# Patient Record
Sex: MALE | Race: WHITE | NOT HISPANIC OR LATINO
[De-identification: names, ages, dates, MRNs, and addresses within clinical notes are randomized per-mention and may not be internally consistent; named-entity substitution may affect disease eponyms.]

---

## 2017-01-13 ENCOUNTER — APPOINTMENT (OUTPATIENT)
Dept: HEART AND VASCULAR | Facility: CLINIC | Age: 72
End: 2017-01-13

## 2017-01-13 VITALS
HEART RATE: 49 BPM | WEIGHT: 177 LBS | BODY MASS INDEX: 22.72 KG/M2 | DIASTOLIC BLOOD PRESSURE: 56 MMHG | SYSTOLIC BLOOD PRESSURE: 104 MMHG | HEIGHT: 74 IN

## 2017-01-15 LAB
ALBUMIN SERPL ELPH-MCNC: 4.2 G/DL
ALP BLD-CCNC: 73 U/L
ALT SERPL-CCNC: 22 U/L
ANION GAP SERPL CALC-SCNC: 12 MMOL/L
AST SERPL-CCNC: 16 U/L
BASOPHILS # BLD AUTO: 0.05 K/UL
BASOPHILS NFR BLD AUTO: 0.8 %
BILIRUB SERPL-MCNC: 0.6 MG/DL
BUN SERPL-MCNC: 21 MG/DL
CALCIUM SERPL-MCNC: 9.7 MG/DL
CHLORIDE SERPL-SCNC: 104 MMOL/L
CHOLEST SERPL-MCNC: 132 MG/DL
CHOLEST/HDLC SERPL: 3.4 RATIO
CO2 SERPL-SCNC: 25 MMOL/L
CREAT SERPL-MCNC: 1.13 MG/DL
EOSINOPHIL # BLD AUTO: 0.26 K/UL
EOSINOPHIL NFR BLD AUTO: 4.1 %
GLUCOSE SERPL-MCNC: 111 MG/DL
HBA1C MFR BLD HPLC: 6.2 %
HCT VFR BLD CALC: 47.2 %
HDLC SERPL-MCNC: 39 MG/DL
HGB BLD-MCNC: 15.4 G/DL
IMM GRANULOCYTES NFR BLD AUTO: 0.2 %
INR PPP: 2.14 RATIO
LDLC SERPL CALC-MCNC: 67 MG/DL
LYMPHOCYTES # BLD AUTO: 1.52 K/UL
LYMPHOCYTES NFR BLD AUTO: 23.9 %
MAN DIFF?: NORMAL
MCHC RBC-ENTMCNC: 31.1 PG
MCHC RBC-ENTMCNC: 32.6 GM/DL
MCV RBC AUTO: 95.4 FL
MONOCYTES # BLD AUTO: 0.55 K/UL
MONOCYTES NFR BLD AUTO: 8.6 %
NEUTROPHILS # BLD AUTO: 3.98 K/UL
NEUTROPHILS NFR BLD AUTO: 62.4 %
PLATELET # BLD AUTO: 157 K/UL
POTASSIUM SERPL-SCNC: 5.2 MMOL/L
PROT SERPL-MCNC: 6.7 G/DL
PT BLD: 24.4 SEC
RBC # BLD: 4.95 M/UL
RBC # FLD: 13.8 %
SODIUM SERPL-SCNC: 141 MMOL/L
TRIGL SERPL-MCNC: 131 MG/DL
TSH SERPL-ACNC: 1.8 UIU/ML
WBC # FLD AUTO: 6.37 K/UL

## 2017-01-15 RX ORDER — CEFDINIR 300 MG/1
300 CAPSULE ORAL
Qty: 18 | Refills: 0 | Status: DISCONTINUED | COMMUNITY
Start: 2016-10-29

## 2017-01-27 ENCOUNTER — APPOINTMENT (OUTPATIENT)
Dept: HEART AND VASCULAR | Facility: CLINIC | Age: 72
End: 2017-01-27

## 2017-01-31 ENCOUNTER — OTHER (OUTPATIENT)
Age: 72
End: 2017-01-31

## 2017-02-10 ENCOUNTER — APPOINTMENT (OUTPATIENT)
Dept: HEART AND VASCULAR | Facility: CLINIC | Age: 72
End: 2017-02-10

## 2017-02-13 ENCOUNTER — OTHER (OUTPATIENT)
Age: 72
End: 2017-02-13

## 2017-03-03 ENCOUNTER — APPOINTMENT (OUTPATIENT)
Dept: HEART AND VASCULAR | Facility: CLINIC | Age: 72
End: 2017-03-03

## 2017-03-06 RX ORDER — WARFARIN 3 MG/1
3 TABLET ORAL
Qty: 15 | Refills: 0 | Status: DISCONTINUED | COMMUNITY
Start: 2017-03-06 | End: 2017-03-06

## 2017-03-17 ENCOUNTER — APPOINTMENT (OUTPATIENT)
Dept: HEART AND VASCULAR | Facility: CLINIC | Age: 72
End: 2017-03-17

## 2017-03-30 ENCOUNTER — OTHER (OUTPATIENT)
Age: 72
End: 2017-03-30

## 2017-03-31 ENCOUNTER — APPOINTMENT (OUTPATIENT)
Dept: HEART AND VASCULAR | Facility: CLINIC | Age: 72
End: 2017-03-31

## 2017-04-03 ENCOUNTER — OTHER (OUTPATIENT)
Age: 72
End: 2017-04-03

## 2017-04-19 ENCOUNTER — APPOINTMENT (OUTPATIENT)
Dept: HEART AND VASCULAR | Facility: CLINIC | Age: 72
End: 2017-04-19

## 2017-04-19 VITALS
SYSTOLIC BLOOD PRESSURE: 102 MMHG | DIASTOLIC BLOOD PRESSURE: 60 MMHG | WEIGHT: 178 LBS | HEIGHT: 74 IN | BODY MASS INDEX: 22.84 KG/M2 | HEART RATE: 47 BPM

## 2017-04-19 DIAGNOSIS — R06.09 OTHER FORMS OF DYSPNEA: ICD-10-CM

## 2017-04-19 RX ORDER — WARFARIN SODIUM 3 MG/1
3 TABLET ORAL
Qty: 15 | Refills: 0 | Status: DISCONTINUED | COMMUNITY
Start: 2017-03-06 | End: 2017-04-19

## 2017-04-20 ENCOUNTER — OTHER (OUTPATIENT)
Age: 72
End: 2017-04-20

## 2017-05-10 ENCOUNTER — APPOINTMENT (OUTPATIENT)
Dept: HEART AND VASCULAR | Facility: CLINIC | Age: 72
End: 2017-05-10

## 2017-05-11 ENCOUNTER — OTHER (OUTPATIENT)
Age: 72
End: 2017-05-11

## 2017-06-02 ENCOUNTER — APPOINTMENT (OUTPATIENT)
Dept: HEART AND VASCULAR | Facility: CLINIC | Age: 72
End: 2017-06-02

## 2017-06-05 ENCOUNTER — OTHER (OUTPATIENT)
Age: 72
End: 2017-06-05

## 2017-06-16 ENCOUNTER — APPOINTMENT (OUTPATIENT)
Dept: HEART AND VASCULAR | Facility: CLINIC | Age: 72
End: 2017-06-16

## 2017-06-19 ENCOUNTER — OTHER (OUTPATIENT)
Age: 72
End: 2017-06-19

## 2017-06-30 ENCOUNTER — APPOINTMENT (OUTPATIENT)
Dept: HEART AND VASCULAR | Facility: CLINIC | Age: 72
End: 2017-06-30

## 2017-07-13 ENCOUNTER — APPOINTMENT (OUTPATIENT)
Dept: HEART AND VASCULAR | Facility: CLINIC | Age: 72
End: 2017-07-13

## 2017-07-14 ENCOUNTER — OTHER (OUTPATIENT)
Age: 72
End: 2017-07-14

## 2017-07-24 ENCOUNTER — APPOINTMENT (OUTPATIENT)
Dept: HEART AND VASCULAR | Facility: CLINIC | Age: 72
End: 2017-07-24

## 2017-07-24 VITALS
WEIGHT: 178 LBS | SYSTOLIC BLOOD PRESSURE: 114 MMHG | HEIGHT: 74 IN | BODY MASS INDEX: 22.84 KG/M2 | HEART RATE: 47 BPM | DIASTOLIC BLOOD PRESSURE: 62 MMHG

## 2017-07-24 DIAGNOSIS — Z00.00 ENCOUNTER FOR GENERAL ADULT MEDICAL EXAMINATION W/OUT ABNORMAL FINDINGS: ICD-10-CM

## 2017-07-24 RX ORDER — TRAVOPROST 0.04 MG/ML
0 SOLUTION/ DROPS OPHTHALMIC
Qty: 2 | Refills: 0 | Status: DISCONTINUED | COMMUNITY
Start: 2017-03-06

## 2017-07-24 RX ORDER — TRAVOPROST (BENZALKONIUM) 0.004 %
0 DROPS OPHTHALMIC (EYE)
Refills: 0 | Status: DISCONTINUED | COMMUNITY
End: 2017-07-24

## 2017-07-24 RX ORDER — LATANOPROST/PF 0.005 %
0.01 DROPS OPHTHALMIC (EYE)
Qty: 2 | Refills: 0 | Status: ACTIVE | COMMUNITY
Start: 2017-06-05

## 2017-07-25 ENCOUNTER — OTHER (OUTPATIENT)
Age: 72
End: 2017-07-25

## 2017-07-25 LAB
ANION GAP SERPL CALC-SCNC: 15 MMOL/L
BASOPHILS # BLD AUTO: 0.11 K/UL
BASOPHILS NFR BLD AUTO: 1.4 %
BUN SERPL-MCNC: 20 MG/DL
CALCIUM SERPL-MCNC: 10.2 MG/DL
CHLORIDE SERPL-SCNC: 101 MMOL/L
CO2 SERPL-SCNC: 26 MMOL/L
CREAT SERPL-MCNC: 1.18 MG/DL
EOSINOPHIL # BLD AUTO: 0.26 K/UL
EOSINOPHIL NFR BLD AUTO: 3.3 %
GLUCOSE SERPL-MCNC: 84 MG/DL
HBA1C MFR BLD HPLC: 6 %
HCT VFR BLD CALC: 47 %
HGB BLD-MCNC: 15.1 G/DL
IMM GRANULOCYTES NFR BLD AUTO: 0.3 %
INR PPP: 3.49 RATIO
LYMPHOCYTES # BLD AUTO: 1.84 K/UL
LYMPHOCYTES NFR BLD AUTO: 23.6 %
MAN DIFF?: NORMAL
MCHC RBC-ENTMCNC: 31.5 PG
MCHC RBC-ENTMCNC: 32.1 GM/DL
MCV RBC AUTO: 97.9 FL
MONOCYTES # BLD AUTO: 0.7 K/UL
MONOCYTES NFR BLD AUTO: 9 %
NEUTROPHILS # BLD AUTO: 4.87 K/UL
NEUTROPHILS NFR BLD AUTO: 62.4 %
PLATELET # BLD AUTO: 171 K/UL
POTASSIUM SERPL-SCNC: 5.1 MMOL/L
PT BLD: 40.5 SEC
RBC # BLD: 4.8 M/UL
RBC # FLD: 14.1 %
SODIUM SERPL-SCNC: 142 MMOL/L
WBC # FLD AUTO: 7.8 K/UL

## 2017-08-14 ENCOUNTER — APPOINTMENT (OUTPATIENT)
Dept: HEART AND VASCULAR | Facility: CLINIC | Age: 72
End: 2017-08-14
Payer: MEDICARE

## 2017-08-14 PROCEDURE — 36415 COLL VENOUS BLD VENIPUNCTURE: CPT

## 2017-08-15 ENCOUNTER — OTHER (OUTPATIENT)
Age: 72
End: 2017-08-15

## 2017-08-24 ENCOUNTER — APPOINTMENT (OUTPATIENT)
Dept: HEART AND VASCULAR | Facility: CLINIC | Age: 72
End: 2017-08-24
Payer: MEDICARE

## 2017-08-24 LAB
INR PPP: 2.57 RATIO
PT BLD: 29.6 SEC

## 2017-08-24 PROCEDURE — 36415 COLL VENOUS BLD VENIPUNCTURE: CPT

## 2017-08-29 ENCOUNTER — RX RENEWAL (OUTPATIENT)
Age: 72
End: 2017-08-29

## 2017-09-08 ENCOUNTER — APPOINTMENT (OUTPATIENT)
Dept: HEART AND VASCULAR | Facility: CLINIC | Age: 72
End: 2017-09-08
Payer: MEDICARE

## 2017-09-08 PROCEDURE — 36415 COLL VENOUS BLD VENIPUNCTURE: CPT

## 2017-09-11 ENCOUNTER — OTHER (OUTPATIENT)
Age: 72
End: 2017-09-11

## 2017-09-28 ENCOUNTER — APPOINTMENT (OUTPATIENT)
Dept: HEART AND VASCULAR | Facility: CLINIC | Age: 72
End: 2017-09-28
Payer: MEDICARE

## 2017-09-28 PROCEDURE — 36415 COLL VENOUS BLD VENIPUNCTURE: CPT

## 2017-09-29 ENCOUNTER — OTHER (OUTPATIENT)
Age: 72
End: 2017-09-29

## 2017-09-29 LAB
INR PPP: 3.14 RATIO
PT BLD: 36.3 SEC

## 2017-10-20 ENCOUNTER — APPOINTMENT (OUTPATIENT)
Dept: HEART AND VASCULAR | Facility: CLINIC | Age: 72
End: 2017-10-20
Payer: MEDICARE

## 2017-10-20 PROCEDURE — 36415 COLL VENOUS BLD VENIPUNCTURE: CPT

## 2017-10-23 ENCOUNTER — OTHER (OUTPATIENT)
Age: 72
End: 2017-10-23

## 2017-10-23 LAB
INR PPP: 3.42 RATIO
PT BLD: 39.6 SEC

## 2017-10-27 ENCOUNTER — APPOINTMENT (OUTPATIENT)
Dept: HEART AND VASCULAR | Facility: CLINIC | Age: 72
End: 2017-10-27
Payer: MEDICARE

## 2017-10-27 VITALS
BODY MASS INDEX: 23.36 KG/M2 | DIASTOLIC BLOOD PRESSURE: 60 MMHG | HEART RATE: 50 BPM | HEIGHT: 74 IN | SYSTOLIC BLOOD PRESSURE: 100 MMHG | WEIGHT: 182 LBS

## 2017-10-27 DIAGNOSIS — Z87.01 PERSONAL HISTORY OF PNEUMONIA (RECURRENT): ICD-10-CM

## 2017-10-27 DIAGNOSIS — Z97.4 PRESENCE OF EXTERNAL HEARING-AID: ICD-10-CM

## 2017-10-27 DIAGNOSIS — R73.9 HYPERGLYCEMIA, UNSPECIFIED: ICD-10-CM

## 2017-10-27 DIAGNOSIS — R00.1 BRADYCARDIA, UNSPECIFIED: ICD-10-CM

## 2017-10-27 PROCEDURE — 99214 OFFICE O/P EST MOD 30 MIN: CPT | Mod: 25

## 2017-10-27 PROCEDURE — 93000 ELECTROCARDIOGRAM COMPLETE: CPT

## 2017-10-27 PROCEDURE — 36415 COLL VENOUS BLD VENIPUNCTURE: CPT

## 2017-10-29 PROBLEM — Z97.4 HEARING AID WORN: Status: ACTIVE | Noted: 2017-10-29

## 2017-10-29 PROBLEM — Z87.01 HISTORY OF PNEUMONIA: Status: RESOLVED | Noted: 2017-01-16 | Resolved: 2017-10-29

## 2017-10-30 ENCOUNTER — TRANSCRIPTION ENCOUNTER (OUTPATIENT)
Age: 72
End: 2017-10-30

## 2017-10-30 ENCOUNTER — OTHER (OUTPATIENT)
Age: 72
End: 2017-10-30

## 2017-10-30 LAB
ALBUMIN SERPL ELPH-MCNC: 4.3 G/DL
ALP BLD-CCNC: 66 U/L
ALT SERPL-CCNC: 22 U/L
ANION GAP SERPL CALC-SCNC: 14 MMOL/L
AST SERPL-CCNC: 20 U/L
BASOPHILS # BLD AUTO: 0.08 K/UL
BASOPHILS NFR BLD AUTO: 0.8 %
BILIRUB SERPL-MCNC: 0.5 MG/DL
BUN SERPL-MCNC: 22 MG/DL
CALCIUM SERPL-MCNC: 9.8 MG/DL
CHLORIDE SERPL-SCNC: 101 MMOL/L
CHOLEST SERPL-MCNC: 131 MG/DL
CHOLEST/HDLC SERPL: 3.2 RATIO
CO2 SERPL-SCNC: 25 MMOL/L
CREAT SERPL-MCNC: 1.19 MG/DL
EOSINOPHIL # BLD AUTO: 0.3 K/UL
EOSINOPHIL NFR BLD AUTO: 3.1 %
GLUCOSE SERPL-MCNC: 79 MG/DL
HBA1C MFR BLD HPLC: 6 %
HCT VFR BLD CALC: 46 %
HDLC SERPL-MCNC: 41 MG/DL
HGB BLD-MCNC: 14.7 G/DL
IMM GRANULOCYTES NFR BLD AUTO: 0.2 %
INR PPP: 3.25 RATIO
LDLC SERPL CALC-MCNC: 48 MG/DL
LYMPHOCYTES # BLD AUTO: 2.02 K/UL
LYMPHOCYTES NFR BLD AUTO: 20.6 %
MAN DIFF?: NORMAL
MCHC RBC-ENTMCNC: 31.4 PG
MCHC RBC-ENTMCNC: 32 GM/DL
MCV RBC AUTO: 98.3 FL
MONOCYTES # BLD AUTO: 0.96 K/UL
MONOCYTES NFR BLD AUTO: 9.8 %
NEUTROPHILS # BLD AUTO: 6.44 K/UL
NEUTROPHILS NFR BLD AUTO: 65.5 %
PLATELET # BLD AUTO: 173 K/UL
POTASSIUM SERPL-SCNC: 5.1 MMOL/L
PROT SERPL-MCNC: 6.6 G/DL
PT BLD: 37.6 SEC
RBC # BLD: 4.68 M/UL
RBC # FLD: 14 %
SODIUM SERPL-SCNC: 140 MMOL/L
TRIGL SERPL-MCNC: 211 MG/DL
TSH SERPL-ACNC: 1.75 UIU/ML
WBC # FLD AUTO: 9.82 K/UL

## 2017-11-17 ENCOUNTER — APPOINTMENT (OUTPATIENT)
Dept: HEART AND VASCULAR | Facility: CLINIC | Age: 72
End: 2017-11-17
Payer: MEDICARE

## 2017-11-17 PROCEDURE — 36415 COLL VENOUS BLD VENIPUNCTURE: CPT

## 2017-11-17 PROCEDURE — G0009: CPT

## 2017-11-17 PROCEDURE — 90732 PPSV23 VACC 2 YRS+ SUBQ/IM: CPT

## 2017-11-19 LAB
INR PPP: 1.72 RATIO
PT BLD: 19.6 SEC

## 2017-11-29 ENCOUNTER — APPOINTMENT (OUTPATIENT)
Dept: HEART AND VASCULAR | Facility: CLINIC | Age: 72
End: 2017-11-29
Payer: MEDICARE

## 2017-11-29 PROCEDURE — 36415 COLL VENOUS BLD VENIPUNCTURE: CPT

## 2017-11-30 LAB
INR PPP: 2.8 RATIO
PT BLD: 32.3 SEC

## 2017-12-27 ENCOUNTER — APPOINTMENT (OUTPATIENT)
Dept: HEART AND VASCULAR | Facility: CLINIC | Age: 72
End: 2017-12-27
Payer: MEDICARE

## 2017-12-27 PROCEDURE — 36415 COLL VENOUS BLD VENIPUNCTURE: CPT

## 2017-12-28 LAB
INR PPP: 3.21 RATIO
PT BLD: 37.1 SEC

## 2018-01-02 ENCOUNTER — EMERGENCY (EMERGENCY)
Facility: HOSPITAL | Age: 73
LOS: 1 days | Discharge: HOME | End: 2018-01-02
Attending: EMERGENCY MEDICINE | Admitting: EMERGENCY MEDICINE
Payer: MEDICARE

## 2018-01-02 VITALS
SYSTOLIC BLOOD PRESSURE: 123 MMHG | OXYGEN SATURATION: 98 % | TEMPERATURE: 97 F | HEART RATE: 63 BPM | DIASTOLIC BLOOD PRESSURE: 71 MMHG | RESPIRATION RATE: 16 BRPM

## 2018-01-02 VITALS
TEMPERATURE: 98 F | OXYGEN SATURATION: 98 % | SYSTOLIC BLOOD PRESSURE: 115 MMHG | DIASTOLIC BLOOD PRESSURE: 70 MMHG | HEART RATE: 53 BPM | RESPIRATION RATE: 16 BRPM
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
## 2018-01-02 DIAGNOSIS — S09.90XA UNSPECIFIED INJURY OF HEAD, INITIAL ENCOUNTER: ICD-10-CM

## 2018-01-02 DIAGNOSIS — Z79.899 OTHER LONG TERM (CURRENT) DRUG THERAPY: ICD-10-CM

## 2018-01-02 DIAGNOSIS — E11.9 TYPE 2 DIABETES MELLITUS WITHOUT COMPLICATIONS: ICD-10-CM
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
## 2018-01-02 DIAGNOSIS — S00.83XA CONTUSION OF OTHER PART OF HEAD, INITIAL ENCOUNTER: ICD-10-CM

## 2018-01-02 LAB
ANION GAP SERPL CALC-SCNC: 11 MMOL/L — SIGNIFICANT CHANGE UP (ref 5–17)
APTT BLD: 49.4 SEC — HIGH (ref 27.5–37.4)
BUN SERPL-MCNC: 25 MG/DL — HIGH (ref 7–23)
CALCIUM SERPL-MCNC: 9.6 MG/DL — SIGNIFICANT CHANGE UP (ref 8.4–10.5)
CHLORIDE SERPL-SCNC: 100 MMOL/L — SIGNIFICANT CHANGE UP (ref 96–108)
CO2 SERPL-SCNC: 25 MMOL/L — SIGNIFICANT CHANGE UP (ref 22–31)
CREAT SERPL-MCNC: 1 MG/DL — SIGNIFICANT CHANGE UP (ref 0.5–1.3)
GLUCOSE SERPL-MCNC: 91 MG/DL — SIGNIFICANT CHANGE UP (ref 70–99)
HCT VFR BLD CALC: 43.6 % — SIGNIFICANT CHANGE UP (ref 39–50)
HGB BLD-MCNC: 14.7 G/DL — SIGNIFICANT CHANGE UP (ref 13–17)
INR BLD: 3.57 — HIGH (ref 0.88–1.16)
MCHC RBC-ENTMCNC: 31.5 PG — SIGNIFICANT CHANGE UP (ref 27–34)
MCHC RBC-ENTMCNC: 33.7 G/DL — SIGNIFICANT CHANGE UP (ref 32–36)
MCV RBC AUTO: 93.6 FL — SIGNIFICANT CHANGE UP (ref 80–100)
PLATELET # BLD AUTO: 154 K/UL — SIGNIFICANT CHANGE UP (ref 150–400)
POTASSIUM SERPL-MCNC: 4.6 MMOL/L — SIGNIFICANT CHANGE UP (ref 3.5–5.3)
POTASSIUM SERPL-SCNC: 4.6 MMOL/L — SIGNIFICANT CHANGE UP (ref 3.5–5.3)
PROTHROM AB SERPL-ACNC: 40.7 SEC — HIGH (ref 9.8–12.7)
RBC # BLD: 4.66 M/UL — SIGNIFICANT CHANGE UP (ref 4.2–5.8)
RBC # FLD: 13.3 % — SIGNIFICANT CHANGE UP (ref 10.3–16.9)
SODIUM SERPL-SCNC: 136 MMOL/L — SIGNIFICANT CHANGE UP (ref 135–145)
WBC # BLD: 8.3 K/UL — SIGNIFICANT CHANGE UP (ref 3.8–10.5)
WBC # FLD AUTO: 8.3 K/UL — SIGNIFICANT CHANGE UP (ref 3.8–10.5)

## 2018-01-02 PROCEDURE — 70450 CT HEAD/BRAIN W/O DYE: CPT

## 2018-01-02 PROCEDURE — 36415 COLL VENOUS BLD VENIPUNCTURE: CPT

## 2018-01-02 PROCEDURE — 99284 EMERGENCY DEPT VISIT MOD MDM: CPT | Mod: 25

## 2018-01-02 PROCEDURE — 99285 EMERGENCY DEPT VISIT HI MDM: CPT

## 2018-01-02 PROCEDURE — 70450 CT HEAD/BRAIN W/O DYE: CPT | Mod: 26

## 2018-01-02 PROCEDURE — 80048 BASIC METABOLIC PNL TOTAL CA: CPT

## 2018-01-02 PROCEDURE — 85610 PROTHROMBIN TIME: CPT

## 2018-01-02 PROCEDURE — 85730 THROMBOPLASTIN TIME PARTIAL: CPT

## 2018-01-02 PROCEDURE — 85027 COMPLETE CBC AUTOMATED: CPT

## 2018-01-02 RX ORDER — BACITRACIN ZINC 500 UNIT/G
1 OINTMENT IN PACKET (EA) TOPICAL ONCE
Qty: 0 | Refills: 0 | Status: COMPLETED | OUTPATIENT
Start: 2018-01-02 | End: 2018-01-02

## 2018-01-02 RX ADMIN — Medication 1 APPLICATION(S): at 18:22

## 2018-01-02 NOTE — ED ADULT NURSE NOTE - CHIEF COMPLAINT QUOTE
as per wife "we were walking down [address] and there was uneven pavement and he fell and hit his head. He's on Warfarin."

## 2018-01-02 NOTE — ED PROVIDER NOTE - CARE PLAN
Principal Discharge DX:	Traumatic injury of head, initial encounter  Secondary Diagnosis:	Abrasion of forehead, initial encounter

## 2018-01-02 NOTE — ED ADULT NURSE REASSESSMENT NOTE - NS ED NURSE REASSESS COMMENT FT1
Patient baseline episodes of confusion, able to answer questions appropriately, denies any pain, no dizziness, nausea/vomititng, no neuro deficitis.  Abrasion noted to left forehead area, denies any headache or pain.  Vital signs stable.  Left FA PIV #20 in place , all labs sent, no complications.   CT scan head completed.  Results and disposition pending.  Fall precaution observed.  Stable and comfortable.

## 2018-01-02 NOTE — ED PROVIDER NOTE - OBJECTIVE STATEMENT
72 m w head injury- hx of dementia, mech valve on coumadin- was walking on street with wife- fell- hit his head L frontal  no LOC + abrasion to eyebrow and forehead  no n/v + mild frontal ha  exac- coumadin  no sig allev factors  moderate severity

## 2018-01-02 NOTE — ED PROVIDER NOTE - MUSCULOSKELETAL, MLM
Spine appears normal, range of motion is not limited, no muscle or joint tenderness + small hematoma L forehead

## 2018-01-02 NOTE — ED ADULT NURSE REASSESSMENT NOTE - NS ED NURSE REASSESS COMMENT FT1
Patient baseline mentation, no pain or other symptom complaint, no neuro deficits.  Vital signs stable.  Left forehead abrasion cleansed, BACITRACIN applied as ordered.  Discharged to home in stable condition.

## 2018-01-02 NOTE — ED ADULT NURSE NOTE - CHPI ED SYMPTOMS NEG
no loss of consciousness/no confusion/no bleeding/no numbness/no weakness/no tingling/no fever/no deformity/no vomiting

## 2018-01-02 NOTE — ED ADULT TRIAGE NOTE - CHIEF COMPLAINT QUOTE
as per wife "we were walking down [place] and there was uneven pavement and he fell and hit his head. He's on Warfarin."

## 2018-01-02 NOTE — ED PROVIDER NOTE - SKIN, MLM
Skin normal color for race, warm, dry + 3 small abrasions to L forehead, L lat eyebrow. No evidence of rash.

## 2018-01-24 ENCOUNTER — APPOINTMENT (OUTPATIENT)
Dept: HEART AND VASCULAR | Facility: CLINIC | Age: 73
End: 2018-01-24
Payer: MEDICARE

## 2018-01-24 VITALS
WEIGHT: 182 LBS | HEIGHT: 74 IN | BODY MASS INDEX: 23.36 KG/M2 | SYSTOLIC BLOOD PRESSURE: 100 MMHG | DIASTOLIC BLOOD PRESSURE: 60 MMHG | HEART RATE: 48 BPM
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
## 2018-01-24 DIAGNOSIS — Z92.29 PERSONAL HISTORY OF OTHER DRUG THERAPY: ICD-10-CM

## 2018-01-24 DIAGNOSIS — F17.200 NICOTINE DEPENDENCE, UNSPECIFIED, UNCOMPLICATED: ICD-10-CM

## 2018-01-24 PROCEDURE — 93000 ELECTROCARDIOGRAM COMPLETE: CPT

## 2018-01-24 PROCEDURE — 99214 OFFICE O/P EST MOD 30 MIN: CPT | Mod: 25

## 2018-01-24 PROCEDURE — 36415 COLL VENOUS BLD VENIPUNCTURE: CPT

## 2018-01-24 PROCEDURE — 90471 IMMUNIZATION ADMIN: CPT

## 2018-01-24 PROCEDURE — 90714 TD VACC NO PRESV 7 YRS+ IM: CPT

## 2018-01-24 PROCEDURE — 93306 TTE W/DOPPLER COMPLETE: CPT

## 2018-01-24 RX ORDER — ACETAMINOPHEN AND CODEINE 300; 30 MG/1; MG/1
300-30 TABLET ORAL
Qty: 30 | Refills: 0 | Status: DISCONTINUED | COMMUNITY
Start: 2018-01-16

## 2018-01-24 RX ORDER — CLINDAMYCIN HYDROCHLORIDE 300 MG/1
300 CAPSULE ORAL
Qty: 28 | Refills: 0 | Status: DISCONTINUED | COMMUNITY
Start: 2018-01-16

## 2018-01-24 RX ORDER — AMOXICILLIN 500 MG/1
500 TABLET, FILM COATED ORAL
Qty: 4 | Refills: 0 | Status: DISCONTINUED | COMMUNITY
Start: 2017-08-23

## 2018-01-25 LAB
ANION GAP SERPL CALC-SCNC: 18 MMOL/L
BASOPHILS # BLD AUTO: 0.08 K/UL
BASOPHILS NFR BLD AUTO: 1.1 %
BUN SERPL-MCNC: 15 MG/DL
CALCIUM SERPL-MCNC: 9 MG/DL
CHLORIDE SERPL-SCNC: 103 MMOL/L
CO2 SERPL-SCNC: 21 MMOL/L
CREAT SERPL-MCNC: 1.09 MG/DL
EOSINOPHIL # BLD AUTO: 0.26 K/UL
EOSINOPHIL NFR BLD AUTO: 3.7 %
GLUCOSE SERPL-MCNC: 98 MG/DL
HBA1C MFR BLD HPLC: 6 %
HCT VFR BLD CALC: 44.9 %
HGB BLD-MCNC: 14.6 G/DL
IMM GRANULOCYTES NFR BLD AUTO: 0.1 %
INR PPP: 3.68 RATIO
LYMPHOCYTES # BLD AUTO: 1.41 K/UL
LYMPHOCYTES NFR BLD AUTO: 19.8 %
MAN DIFF?: NORMAL
MCHC RBC-ENTMCNC: 31.8 PG
MCHC RBC-ENTMCNC: 32.5 GM/DL
MCV RBC AUTO: 97.8 FL
MONOCYTES # BLD AUTO: 0.7 K/UL
MONOCYTES NFR BLD AUTO: 9.8 %
NEUTROPHILS # BLD AUTO: 4.66 K/UL
NEUTROPHILS NFR BLD AUTO: 65.5 %
PLATELET # BLD AUTO: 166 K/UL
POTASSIUM SERPL-SCNC: 4.7 MMOL/L
PT BLD: 42.7 SEC
RBC # BLD: 4.59 M/UL
RBC # FLD: 14.1 %
SODIUM SERPL-SCNC: 142 MMOL/L
TSH SERPL-ACNC: 1 UIU/ML
WBC # FLD AUTO: 7.12 K/UL

## 2018-01-30 ENCOUNTER — MED ADMIN CHARGE (OUTPATIENT)
Age: 73
End: 2018-01-30

## 2018-02-08 ENCOUNTER — APPOINTMENT (OUTPATIENT)
Dept: HEART AND VASCULAR | Facility: CLINIC | Age: 73
End: 2018-02-08
Payer: MEDICARE

## 2018-02-08 PROCEDURE — 36415 COLL VENOUS BLD VENIPUNCTURE: CPT

## 2018-02-09 LAB
INR PPP: 3.41 RATIO
PT BLD: 39.5 SEC

## 2018-02-22 ENCOUNTER — APPOINTMENT (OUTPATIENT)
Dept: HEART AND VASCULAR | Facility: CLINIC | Age: 73
End: 2018-02-22
Payer: MEDICARE

## 2018-02-22 ENCOUNTER — RX RENEWAL (OUTPATIENT)
Age: 73
End: 2018-02-22

## 2018-02-22 PROCEDURE — 36415 COLL VENOUS BLD VENIPUNCTURE: CPT

## 2018-02-23 LAB
INR PPP: 3.17 RATIO
PT BLD: 36.7 SEC

## 2018-03-08 ENCOUNTER — APPOINTMENT (OUTPATIENT)
Dept: HEART AND VASCULAR | Facility: CLINIC | Age: 73
End: 2018-03-08
Payer: MEDICARE

## 2018-03-08 PROCEDURE — 36415 COLL VENOUS BLD VENIPUNCTURE: CPT

## 2018-03-09 LAB
INR PPP: 2.75 RATIO
PT BLD: 31.7 SEC

## 2018-03-27 ENCOUNTER — APPOINTMENT (OUTPATIENT)
Dept: HEART AND VASCULAR | Facility: CLINIC | Age: 73
End: 2018-03-27
Payer: MEDICARE

## 2018-03-27 PROCEDURE — 36415 COLL VENOUS BLD VENIPUNCTURE: CPT

## 2018-03-28 LAB
INR PPP: 3.37 RATIO
PT BLD: 39 SEC

## 2018-04-05 ENCOUNTER — RX RENEWAL (OUTPATIENT)
Age: 73
End: 2018-04-05

## 2018-04-20 ENCOUNTER — APPOINTMENT (OUTPATIENT)
Dept: HEART AND VASCULAR | Facility: CLINIC | Age: 73
End: 2018-04-20

## 2018-04-24 ENCOUNTER — APPOINTMENT (OUTPATIENT)
Dept: HEART AND VASCULAR | Facility: CLINIC | Age: 73
End: 2018-04-24
Payer: MEDICARE

## 2018-04-24 ENCOUNTER — FORM ENCOUNTER (OUTPATIENT)
Age: 73
End: 2018-04-24

## 2018-04-24 VITALS
DIASTOLIC BLOOD PRESSURE: 62 MMHG | BODY MASS INDEX: 22.59 KG/M2 | HEART RATE: 62 BPM | WEIGHT: 176 LBS | SYSTOLIC BLOOD PRESSURE: 102 MMHG | HEIGHT: 74 IN

## 2018-04-24 DIAGNOSIS — Z87.891 PERSONAL HISTORY OF NICOTINE DEPENDENCE: ICD-10-CM

## 2018-04-24 PROCEDURE — 93000 ELECTROCARDIOGRAM COMPLETE: CPT

## 2018-04-24 PROCEDURE — 93321 DOPPLER ECHO F-UP/LMTD STD: CPT

## 2018-04-24 PROCEDURE — 93308 TTE F-UP OR LMTD: CPT

## 2018-04-24 PROCEDURE — 36415 COLL VENOUS BLD VENIPUNCTURE: CPT

## 2018-04-24 PROCEDURE — 99215 OFFICE O/P EST HI 40 MIN: CPT | Mod: 25

## 2018-04-24 PROCEDURE — 93325 DOPPLER ECHO COLOR FLOW MAPG: CPT

## 2018-04-25 ENCOUNTER — OUTPATIENT (OUTPATIENT)
Dept: OUTPATIENT SERVICES | Facility: HOSPITAL | Age: 73
LOS: 1 days | End: 2018-04-25
Payer: MEDICARE

## 2018-04-25 ENCOUNTER — APPOINTMENT (OUTPATIENT)
Dept: CT IMAGING | Facility: HOSPITAL | Age: 73
End: 2018-04-25
Payer: MEDICARE

## 2018-04-25 LAB
ALBUMIN SERPL ELPH-MCNC: 4.5 G/DL
ALP BLD-CCNC: 76 U/L
ALT SERPL-CCNC: 32 U/L
ANION GAP SERPL CALC-SCNC: 13 MMOL/L
APTT BLD: 49.4 SEC
AST SERPL-CCNC: 27 U/L
BASOPHILS # BLD AUTO: 0.08 K/UL
BASOPHILS NFR BLD AUTO: 0.9 %
BILIRUB SERPL-MCNC: 0.7 MG/DL
BUN SERPL-MCNC: 17 MG/DL
CALCIUM SERPL-MCNC: 10 MG/DL
CHLORIDE SERPL-SCNC: 103 MMOL/L
CHOLEST SERPL-MCNC: 149 MG/DL
CHOLEST/HDLC SERPL: 3.4 RATIO
CO2 SERPL-SCNC: 26 MMOL/L
CREAT SERPL-MCNC: 1.21 MG/DL
EOSINOPHIL # BLD AUTO: 0.39 K/UL
EOSINOPHIL NFR BLD AUTO: 4.3 %
GLUCOSE SERPL-MCNC: 109 MG/DL
HBA1C MFR BLD HPLC: 6.1 %
HCT VFR BLD CALC: 51 %
HDLC SERPL-MCNC: 44 MG/DL
HGB BLD-MCNC: 15.9 G/DL
IMM GRANULOCYTES NFR BLD AUTO: 0.3 %
INR PPP: 3.14 RATIO
LDLC SERPL CALC-MCNC: 63 MG/DL
LYMPHOCYTES # BLD AUTO: 1.75 K/UL
LYMPHOCYTES NFR BLD AUTO: 19.1 %
MAN DIFF?: NORMAL
MCHC RBC-ENTMCNC: 31.2 GM/DL
MCHC RBC-ENTMCNC: 31.3 PG
MCV RBC AUTO: 100.4 FL
MONOCYTES # BLD AUTO: 0.8 K/UL
MONOCYTES NFR BLD AUTO: 8.8 %
NEUTROPHILS # BLD AUTO: 6.09 K/UL
NEUTROPHILS NFR BLD AUTO: 66.6 %
PLATELET # BLD AUTO: 193 K/UL
POTASSIUM SERPL-SCNC: 5.3 MMOL/L
PROT SERPL-MCNC: 7.1 G/DL
PT BLD: 36.3 SEC
RBC # BLD: 5.08 M/UL
RBC # FLD: 14.1 %
SODIUM SERPL-SCNC: 142 MMOL/L
TRIGL SERPL-MCNC: 209 MG/DL
WBC # FLD AUTO: 9.14 K/UL

## 2018-04-25 PROCEDURE — 70450 CT HEAD/BRAIN W/O DYE: CPT | Mod: 26

## 2018-04-25 PROCEDURE — 70450 CT HEAD/BRAIN W/O DYE: CPT

## 2018-05-11 ENCOUNTER — APPOINTMENT (OUTPATIENT)
Dept: HEART AND VASCULAR | Facility: CLINIC | Age: 73
End: 2018-05-11
Payer: MEDICARE

## 2018-05-11 VITALS
DIASTOLIC BLOOD PRESSURE: 60 MMHG | BODY MASS INDEX: 22.85 KG/M2 | WEIGHT: 178 LBS | HEART RATE: 50 BPM | SYSTOLIC BLOOD PRESSURE: 114 MMHG

## 2018-05-11 PROCEDURE — 99213 OFFICE O/P EST LOW 20 MIN: CPT | Mod: 25

## 2018-05-11 PROCEDURE — 93880 EXTRACRANIAL BILAT STUDY: CPT

## 2018-05-11 PROCEDURE — 93000 ELECTROCARDIOGRAM COMPLETE: CPT

## 2018-05-17 ENCOUNTER — LABORATORY RESULT (OUTPATIENT)
Age: 73
End: 2018-05-17

## 2018-05-17 ENCOUNTER — APPOINTMENT (OUTPATIENT)
Dept: HEART AND VASCULAR | Facility: CLINIC | Age: 73
End: 2018-05-17
Payer: MEDICARE

## 2018-05-17 PROCEDURE — 36415 COLL VENOUS BLD VENIPUNCTURE: CPT

## 2018-06-01 ENCOUNTER — APPOINTMENT (OUTPATIENT)
Dept: HEART AND VASCULAR | Facility: CLINIC | Age: 73
End: 2018-06-01
Payer: MEDICARE

## 2018-06-01 LAB
INR PPP: 3.79 RATIO
PT BLD: 44 SEC

## 2018-06-01 PROCEDURE — 36415 COLL VENOUS BLD VENIPUNCTURE: CPT

## 2018-06-15 ENCOUNTER — APPOINTMENT (OUTPATIENT)
Dept: HEART AND VASCULAR | Facility: CLINIC | Age: 73
End: 2018-06-15
Payer: MEDICARE

## 2018-06-15 LAB
INR PPP: 3.23 RATIO
PT BLD: 37.4 SEC

## 2018-06-15 PROCEDURE — 36415 COLL VENOUS BLD VENIPUNCTURE: CPT

## 2018-07-02 ENCOUNTER — RX RENEWAL (OUTPATIENT)
Age: 73
End: 2018-07-02

## 2018-07-05 ENCOUNTER — APPOINTMENT (OUTPATIENT)
Dept: HEART AND VASCULAR | Facility: CLINIC | Age: 73
End: 2018-07-05
Payer: MEDICARE

## 2018-07-05 PROCEDURE — 36415 COLL VENOUS BLD VENIPUNCTURE: CPT

## 2018-07-06 LAB
INR PPP: 2.88 RATIO
PT BLD: 33.3 SEC

## 2018-07-17 PROBLEM — Z95.2 PRESENCE OF PROSTHETIC HEART VALVE: Chronic | Status: ACTIVE | Noted: 2018-01-02

## 2018-07-17 PROBLEM — G30.9 ALZHEIMER'S DISEASE, UNSPECIFIED: Chronic | Status: ACTIVE | Noted: 2018-01-02

## 2018-07-17 PROBLEM — E11.9 TYPE 2 DIABETES MELLITUS WITHOUT COMPLICATIONS: Chronic | Status: ACTIVE | Noted: 2018-01-02

## 2018-07-17 RX ORDER — AMOXICILLIN 500 MG/1
500 TABLET, FILM COATED ORAL
Qty: 8 | Refills: 2 | Status: ACTIVE | COMMUNITY
Start: 2018-07-17 | End: 1900-01-01

## 2018-07-30 ENCOUNTER — APPOINTMENT (OUTPATIENT)
Dept: HEART AND VASCULAR | Facility: CLINIC | Age: 73
End: 2018-07-30
Payer: MEDICARE

## 2018-07-30 PROCEDURE — 36415 COLL VENOUS BLD VENIPUNCTURE: CPT

## 2018-07-31 LAB
INR PPP: 3.1 RATIO
PT BLD: 35.9 SEC

## 2018-08-17 ENCOUNTER — APPOINTMENT (OUTPATIENT)
Dept: HEART AND VASCULAR | Facility: CLINIC | Age: 73
End: 2018-08-17
Payer: MEDICARE

## 2018-08-17 VITALS
HEART RATE: 47 BPM | HEIGHT: 74 IN | DIASTOLIC BLOOD PRESSURE: 62 MMHG | SYSTOLIC BLOOD PRESSURE: 110 MMHG | WEIGHT: 180 LBS | BODY MASS INDEX: 23.1 KG/M2

## 2018-08-17 DIAGNOSIS — G45.9 TRANSIENT CEREBRAL ISCHEMIC ATTACK, UNSPECIFIED: ICD-10-CM

## 2018-08-17 DIAGNOSIS — Z01.810 ENCOUNTER FOR PREPROCEDURAL CARDIOVASCULAR EXAMINATION: ICD-10-CM

## 2018-08-17 DIAGNOSIS — R26.9 UNSPECIFIED ABNORMALITIES OF GAIT AND MOBILITY: ICD-10-CM

## 2018-08-17 PROCEDURE — 36415 COLL VENOUS BLD VENIPUNCTURE: CPT

## 2018-08-17 PROCEDURE — 93000 ELECTROCARDIOGRAM COMPLETE: CPT

## 2018-08-17 PROCEDURE — 99214 OFFICE O/P EST MOD 30 MIN: CPT | Mod: 25

## 2018-08-19 PROBLEM — G45.9 TRANSIENT CEREBRAL ISCHEMIA, UNSPECIFIED TYPE: Status: ACTIVE | Noted: 2018-04-24

## 2018-08-19 PROBLEM — R26.9 ABNORMAL GAIT: Status: ACTIVE | Noted: 2018-08-19

## 2018-08-19 LAB
ALBUMIN SERPL ELPH-MCNC: 4.8 G/DL
ALP BLD-CCNC: 79 U/L
ALT SERPL-CCNC: 28 U/L
ANION GAP SERPL CALC-SCNC: 28 MMOL/L
APTT BLD: 48.6 SEC
AST SERPL-CCNC: 34 U/L
BASOPHILS # BLD AUTO: 0.09 K/UL
BASOPHILS NFR BLD AUTO: 1.2 %
BILIRUB SERPL-MCNC: 0.8 MG/DL
BUN SERPL-MCNC: 24 MG/DL
CALCIUM SERPL-MCNC: 10.1 MG/DL
CHLORIDE SERPL-SCNC: 97 MMOL/L
CHOLEST SERPL-MCNC: 121 MG/DL
CHOLEST/HDLC SERPL: 3 RATIO
CO2 SERPL-SCNC: 23 MMOL/L
CREAT SERPL-MCNC: 1.24 MG/DL
EOSINOPHIL # BLD AUTO: 0.38 K/UL
EOSINOPHIL NFR BLD AUTO: 5 %
GLUCOSE SERPL-MCNC: 29 MG/DL
HCT VFR BLD CALC: 48.4 %
HDLC SERPL-MCNC: 40 MG/DL
HGB BLD-MCNC: 15.4 G/DL
IMM GRANULOCYTES NFR BLD AUTO: 0.1 %
INR PPP: 2.23 RATIO
LDLC SERPL CALC-MCNC: 52 MG/DL
LYMPHOCYTES # BLD AUTO: 1.3 K/UL
LYMPHOCYTES NFR BLD AUTO: 17.2 %
MAN DIFF?: NORMAL
MCHC RBC-ENTMCNC: 31.8 GM/DL
MCHC RBC-ENTMCNC: 31.8 PG
MCV RBC AUTO: 100 FL
MONOCYTES # BLD AUTO: 0.78 K/UL
MONOCYTES NFR BLD AUTO: 10.3 %
NEUTROPHILS # BLD AUTO: 5.02 K/UL
NEUTROPHILS NFR BLD AUTO: 66.2 %
PLATELET # BLD AUTO: 143 K/UL
POTASSIUM SERPL-SCNC: 5 MMOL/L
PROT SERPL-MCNC: 7.3 G/DL
PT BLD: 25.6 SEC
RBC # BLD: 4.84 M/UL
RBC # FLD: 14.1 %
SODIUM SERPL-SCNC: 148 MMOL/L
TRIGL SERPL-MCNC: 146 MG/DL
TSH SERPL-ACNC: 1.27 UIU/ML
WBC # FLD AUTO: 7.58 K/UL

## 2018-08-19 RX ORDER — MUPIROCIN 20 MG/G
2 OINTMENT TOPICAL
Qty: 22 | Refills: 0 | Status: DISCONTINUED | COMMUNITY
Start: 2018-07-07

## 2018-08-20 ENCOUNTER — APPOINTMENT (OUTPATIENT)
Dept: HEART AND VASCULAR | Facility: CLINIC | Age: 73
End: 2018-08-20
Payer: MEDICARE

## 2018-08-20 ENCOUNTER — LABORATORY RESULT (OUTPATIENT)
Age: 73
End: 2018-08-20

## 2018-08-20 PROCEDURE — 36415 COLL VENOUS BLD VENIPUNCTURE: CPT

## 2018-08-31 ENCOUNTER — APPOINTMENT (OUTPATIENT)
Dept: HEART AND VASCULAR | Facility: CLINIC | Age: 73
End: 2018-08-31
Payer: MEDICARE

## 2018-08-31 PROCEDURE — 36415 COLL VENOUS BLD VENIPUNCTURE: CPT

## 2018-09-02 LAB
HBA1C MFR BLD HPLC: 6.2 %
INR PPP: 3.68 RATIO
PT BLD: 42.7 SEC

## 2018-09-14 ENCOUNTER — APPOINTMENT (OUTPATIENT)
Dept: HEART AND VASCULAR | Facility: CLINIC | Age: 73
End: 2018-09-14
Payer: MEDICARE

## 2018-09-14 PROCEDURE — 36415 COLL VENOUS BLD VENIPUNCTURE: CPT

## 2018-09-16 LAB
INR PPP: 2.62 RATIO
PT BLD: 30.2 SEC

## 2018-10-01 ENCOUNTER — APPOINTMENT (OUTPATIENT)
Dept: HEART AND VASCULAR | Facility: CLINIC | Age: 73
End: 2018-10-01
Payer: MEDICARE

## 2018-10-01 PROCEDURE — 36415 COLL VENOUS BLD VENIPUNCTURE: CPT

## 2018-10-03 LAB
INR PPP: 3.81 RATIO
PT BLD: 44.3 SEC

## 2018-10-15 ENCOUNTER — APPOINTMENT (OUTPATIENT)
Dept: HEART AND VASCULAR | Facility: CLINIC | Age: 73
End: 2018-10-15
Payer: MEDICARE

## 2018-10-15 PROCEDURE — 36415 COLL VENOUS BLD VENIPUNCTURE: CPT

## 2018-10-16 LAB
INR PPP: 4.06 RATIO
PT BLD: 47.2 SEC

## 2018-10-29 ENCOUNTER — APPOINTMENT (OUTPATIENT)
Dept: HEART AND VASCULAR | Facility: CLINIC | Age: 73
End: 2018-10-29
Payer: MEDICARE

## 2018-10-29 PROCEDURE — 36415 COLL VENOUS BLD VENIPUNCTURE: CPT

## 2018-10-29 RX ORDER — DIVALPROEX SODIUM 500 MG/1
TABLET, DELAYED RELEASE ORAL
Refills: 0 | Status: ACTIVE | COMMUNITY

## 2018-10-30 LAB
INR PPP: 2.77 RATIO
PT BLD: 32 SEC

## 2018-11-12 ENCOUNTER — APPOINTMENT (OUTPATIENT)
Dept: HEART AND VASCULAR | Facility: CLINIC | Age: 73
End: 2018-11-12
Payer: MEDICARE

## 2018-11-12 PROCEDURE — 36415 COLL VENOUS BLD VENIPUNCTURE: CPT

## 2018-11-14 LAB
INR PPP: 3.06 RATIO
PT BLD: 36.1 SEC

## 2018-11-29 ENCOUNTER — NON-APPOINTMENT (OUTPATIENT)
Age: 73
End: 2018-11-29

## 2018-11-29 ENCOUNTER — APPOINTMENT (OUTPATIENT)
Dept: HEART AND VASCULAR | Facility: CLINIC | Age: 73
End: 2018-11-29
Payer: MEDICARE

## 2018-11-29 VITALS
HEART RATE: 47 BPM | WEIGHT: 180 LBS | HEIGHT: 74 IN | DIASTOLIC BLOOD PRESSURE: 68 MMHG | SYSTOLIC BLOOD PRESSURE: 110 MMHG | BODY MASS INDEX: 23.1 KG/M2

## 2018-11-29 VITALS — TEMPERATURE: 97.6 F

## 2018-11-29 DIAGNOSIS — I10 ESSENTIAL (PRIMARY) HYPERTENSION: ICD-10-CM

## 2018-11-29 DIAGNOSIS — J06.9 ACUTE UPPER RESPIRATORY INFECTION, UNSPECIFIED: ICD-10-CM

## 2018-11-29 PROCEDURE — 99214 OFFICE O/P EST MOD 30 MIN: CPT | Mod: 25

## 2018-11-29 PROCEDURE — 36415 COLL VENOUS BLD VENIPUNCTURE: CPT

## 2018-11-29 PROCEDURE — 93000 ELECTROCARDIOGRAM COMPLETE: CPT

## 2018-11-29 RX ORDER — ZOSTER VACCINE RECOMBINANT, ADJUVANTED 50 MCG/0.5
50 KIT INTRAMUSCULAR
Qty: 1 | Refills: 0 | Status: ACTIVE | COMMUNITY
Start: 2018-11-29 | End: 1900-01-01

## 2018-11-30 LAB
ALBUMIN SERPL ELPH-MCNC: 4.3 G/DL
ALP BLD-CCNC: 72 U/L
ALT SERPL-CCNC: 19 U/L
ANION GAP SERPL CALC-SCNC: 12 MMOL/L
AST SERPL-CCNC: 15 U/L
BASOPHILS # BLD AUTO: 0.05 K/UL
BASOPHILS NFR BLD AUTO: 0.7 %
BILIRUB SERPL-MCNC: 0.6 MG/DL
BUN SERPL-MCNC: 17 MG/DL
CALCIUM SERPL-MCNC: 9.2 MG/DL
CHLORIDE SERPL-SCNC: 103 MMOL/L
CHOLEST SERPL-MCNC: 124 MG/DL
CHOLEST/HDLC SERPL: 3.5 RATIO
CO2 SERPL-SCNC: 28 MMOL/L
CREAT SERPL-MCNC: 1.1 MG/DL
EOSINOPHIL # BLD AUTO: 0.51 K/UL
EOSINOPHIL NFR BLD AUTO: 7.2 %
GLUCOSE SERPL-MCNC: 108 MG/DL
HBA1C MFR BLD HPLC: 6.4 %
HCT VFR BLD CALC: 46 %
HDLC SERPL-MCNC: 35 MG/DL
HGB BLD-MCNC: 14.7 G/DL
IMM GRANULOCYTES NFR BLD AUTO: 0.3 %
INR PPP: 3.72 RATIO
LDLC SERPL CALC-MCNC: 55 MG/DL
LYMPHOCYTES # BLD AUTO: 1.51 K/UL
LYMPHOCYTES NFR BLD AUTO: 21.3 %
MAN DIFF?: NORMAL
MCHC RBC-ENTMCNC: 31.1 PG
MCHC RBC-ENTMCNC: 32 GM/DL
MCV RBC AUTO: 97.5 FL
MONOCYTES # BLD AUTO: 0.7 K/UL
MONOCYTES NFR BLD AUTO: 9.9 %
NEUTROPHILS # BLD AUTO: 4.3 K/UL
NEUTROPHILS NFR BLD AUTO: 60.6 %
PLATELET # BLD AUTO: 141 K/UL
POTASSIUM SERPL-SCNC: 4.9 MMOL/L
PROT SERPL-MCNC: 6.5 G/DL
PT BLD: 44.2 SEC
RBC # BLD: 4.72 M/UL
RBC # FLD: 13.9 %
SODIUM SERPL-SCNC: 143 MMOL/L
TRIGL SERPL-MCNC: 171 MG/DL
WBC # FLD AUTO: 7.09 K/UL

## 2018-12-01 PROBLEM — J06.9 UPPER RESPIRATORY INFECTION, ACUTE: Status: ACTIVE | Noted: 2018-12-01

## 2018-12-01 NOTE — PHYSICAL EXAM
[General Appearance - Well Developed] : well developed [Normal Appearance] : normal appearance [Well Groomed] : well groomed [General Appearance - Well Nourished] : well nourished [No Deformities] : no deformities [General Appearance - In No Acute Distress] : no acute distress [Normal Conjunctiva] : the conjunctiva exhibited no abnormalities [Eyelids - No Xanthelasma] : the eyelids demonstrated no xanthelasmas [Normal Oral Mucosa] : normal oral mucosa [No Oral Pallor] : no oral pallor [No Oral Cyanosis] : no oral cyanosis [Normal Jugular Venous A Waves Present] : normal jugular venous A waves present [Normal Jugular Venous V Waves Present] : normal jugular venous V waves present [No Jugular Venous Paul A Waves] : no jugular venous paul A waves [Respiration, Rhythm And Depth] : normal respiratory rhythm and effort [Exaggerated Use Of Accessory Muscles For Inspiration] : no accessory muscle use [Auscultation Breath Sounds / Voice Sounds] : lungs were clear to auscultation bilaterally [Heart Rate And Rhythm] : heart rate and rhythm were normal [Heart Sounds] : normal S1 and S2 [Murmurs] : no murmurs present [Arterial Pulses Normal] : the arterial pulses were normal [Abdomen Soft] : soft [Abdomen Tenderness] : non-tender [Abdomen Mass (___ Cm)] : no abdominal mass palpated [Abnormal Walk] : normal gait [Gait - Sufficient For Exercise Testing] : the gait was sufficient for exercise testing [Nail Clubbing] : no clubbing of the fingernails [Cyanosis, Localized] : no localized cyanosis [Petechial Hemorrhages (___cm)] : no petechial hemorrhages [Skin Color & Pigmentation] : normal skin color and pigmentation [] : no rash [No Venous Stasis] : no venous stasis [Skin Lesions] : no skin lesions [No Skin Ulcers] : no skin ulcer [No Xanthoma] : no  xanthoma was observed [FreeTextEntry1] : Motor 5/5. Sensory intact. Cerebellar intact. Cranial nerves intact.

## 2018-12-01 NOTE — DISCUSSION/SUMMARY
[FreeTextEntry1] : The patient has an upper respiratory infection. He will take over-the-counter medication. His wife declines medication for his mild nighttime confusion. He is up-to-date on his health maintenance. He has a prosthetic aortic valve. It is functioning normally on exam. His exercise tolerance is good. His EKG shows minimal abnormality. His blood pressure is excellent. He will continue his current medication.

## 2018-12-01 NOTE — HISTORY OF PRESENT ILLNESS
[FreeTextEntry1] : The patient has had a cough with sputum of unknown color. It has been stable for a week. The patient walks 6 blocks slowly and has no difficulty with an incline. He climbs 3 flights of stairs. He is seeing a dermatologist. The plastic surgeons determined that a skin cancer was completely removed. The patient is more confused at night. He is fasting. He has eye examinations regularly. He received a flu vaccine.

## 2018-12-03 LAB — TSH SERPL-ACNC: 2.17 UIU/ML

## 2018-12-03 RX ORDER — WARFARIN 2.5 MG/1
2.5 TABLET ORAL
Qty: 30 | Refills: 11 | Status: ACTIVE | COMMUNITY
Start: 2017-02-20 | End: 1900-01-01

## 2018-12-17 ENCOUNTER — APPOINTMENT (OUTPATIENT)
Dept: HEART AND VASCULAR | Facility: CLINIC | Age: 73
End: 2018-12-17
Payer: MEDICARE

## 2018-12-17 PROCEDURE — 36415 COLL VENOUS BLD VENIPUNCTURE: CPT

## 2018-12-18 LAB
INR PPP: 2.73 RATIO
PT BLD: 31.5 SEC

## 2019-01-07 ENCOUNTER — APPOINTMENT (OUTPATIENT)
Dept: HEART AND VASCULAR | Facility: CLINIC | Age: 74
End: 2019-01-07
Payer: MEDICARE

## 2019-01-07 PROCEDURE — 36415 COLL VENOUS BLD VENIPUNCTURE: CPT

## 2019-01-08 LAB
INR PPP: 2.93 RATIO
PT BLD: 34.5 SEC

## 2019-01-28 ENCOUNTER — APPOINTMENT (OUTPATIENT)
Dept: HEART AND VASCULAR | Facility: CLINIC | Age: 74
End: 2019-01-28
Payer: MEDICARE

## 2019-01-28 PROCEDURE — 36415 COLL VENOUS BLD VENIPUNCTURE: CPT

## 2019-01-29 LAB
INR PPP: 3.98 RATIO
PT BLD: 46.5 SEC

## 2019-02-14 ENCOUNTER — LABORATORY RESULT (OUTPATIENT)
Age: 74
End: 2019-02-14

## 2019-02-14 ENCOUNTER — APPOINTMENT (OUTPATIENT)
Dept: HEART AND VASCULAR | Facility: CLINIC | Age: 74
End: 2019-02-14
Payer: MEDICARE

## 2019-02-14 PROCEDURE — 36415 COLL VENOUS BLD VENIPUNCTURE: CPT

## 2019-02-26 ENCOUNTER — EMERGENCY (EMERGENCY)
Facility: HOSPITAL | Age: 74
LOS: 1 days | Discharge: HOME | End: 2019-02-26
Attending: EMERGENCY MEDICINE | Admitting: EMERGENCY MEDICINE
Payer: MEDICARE

## 2019-02-26 VITALS
SYSTOLIC BLOOD PRESSURE: 124 MMHG | HEART RATE: 54 BPM | RESPIRATION RATE: 18 BRPM | OXYGEN SATURATION: 96 % | TEMPERATURE: 97 F | DIASTOLIC BLOOD PRESSURE: 69 MMHG

## 2019-02-26 VITALS
TEMPERATURE: 97 F | OXYGEN SATURATION: 94 % | WEIGHT: 165.35 LBS | SYSTOLIC BLOOD PRESSURE: 108 MMHG | DIASTOLIC BLOOD PRESSURE: 59 MMHG | RESPIRATION RATE: 18 BRPM | HEART RATE: 51 BPM

## 2019-02-26 PROCEDURE — 80053 COMPREHEN METABOLIC PANEL: CPT

## 2019-02-26 PROCEDURE — 71045 X-RAY EXAM CHEST 1 VIEW: CPT | Mod: 26

## 2019-02-26 PROCEDURE — 99284 EMERGENCY DEPT VISIT MOD MDM: CPT | Mod: 25

## 2019-02-26 PROCEDURE — 72125 CT NECK SPINE W/O DYE: CPT | Mod: 26

## 2019-02-26 PROCEDURE — 82553 CREATINE MB FRACTION: CPT

## 2019-02-26 PROCEDURE — 72170 X-RAY EXAM OF PELVIS: CPT

## 2019-02-26 PROCEDURE — 70450 CT HEAD/BRAIN W/O DYE: CPT

## 2019-02-26 PROCEDURE — 82550 ASSAY OF CK (CPK): CPT

## 2019-02-26 PROCEDURE — 85610 PROTHROMBIN TIME: CPT

## 2019-02-26 PROCEDURE — 99284 EMERGENCY DEPT VISIT MOD MDM: CPT

## 2019-02-26 PROCEDURE — 36415 COLL VENOUS BLD VENIPUNCTURE: CPT

## 2019-02-26 PROCEDURE — 84484 ASSAY OF TROPONIN QUANT: CPT

## 2019-02-26 PROCEDURE — 85730 THROMBOPLASTIN TIME PARTIAL: CPT

## 2019-02-26 PROCEDURE — 85025 COMPLETE CBC W/AUTO DIFF WBC: CPT

## 2019-02-26 PROCEDURE — 72170 X-RAY EXAM OF PELVIS: CPT | Mod: 26

## 2019-02-26 PROCEDURE — 70450 CT HEAD/BRAIN W/O DYE: CPT | Mod: 26

## 2019-02-26 PROCEDURE — 72125 CT NECK SPINE W/O DYE: CPT

## 2019-02-26 PROCEDURE — 90715 TDAP VACCINE 7 YRS/> IM: CPT

## 2019-02-26 PROCEDURE — 82962 GLUCOSE BLOOD TEST: CPT

## 2019-02-26 PROCEDURE — 71045 X-RAY EXAM CHEST 1 VIEW: CPT

## 2019-02-26 PROCEDURE — 90471 IMMUNIZATION ADMIN: CPT

## 2019-02-26 RX ORDER — TETANUS TOXOID, REDUCED DIPHTHERIA TOXOID AND ACELLULAR PERTUSSIS VACCINE, ADSORBED 5; 2.5; 8; 8; 2.5 [IU]/.5ML; [IU]/.5ML; UG/.5ML; UG/.5ML; UG/.5ML
0.5 SUSPENSION INTRAMUSCULAR ONCE
Qty: 0 | Refills: 0 | Status: COMPLETED | OUTPATIENT
Start: 2019-02-26 | End: 2019-02-26

## 2019-02-26 RX ADMIN — TETANUS TOXOID, REDUCED DIPHTHERIA TOXOID AND ACELLULAR PERTUSSIS VACCINE, ADSORBED 0.5 MILLILITER(S): 5; 2.5; 8; 8; 2.5 SUSPENSION INTRAMUSCULAR at 16:12

## 2019-02-26 NOTE — ED PROVIDER NOTE - CARE PLAN
Principal Discharge DX:	Contusion of other part of head, initial encounter  Secondary Diagnosis:	Abrasions of multiple sites  Secondary Diagnosis:	Fall, initial encounter

## 2019-02-26 NOTE — ED PROVIDER NOTE - CLINICAL SUMMARY MEDICAL DECISION MAKING FREE TEXT BOX
Patient in ED s/p mechanical fall witnessed by wife today while walking on the treadmill.  Patient ambulating on arrival to ED without difficulty.  At baseline mentation as per wife at bedside.  Abrasions noted to hands - tetanus updated.  No pain or grimacing on palpation x 4 extremities.  No deformities.  ROM is noted limited.  CT head and cervical spine unremarkable for acute process.  X ray chest and pelvis also unremarkable.  Patient's wife at bedside is comfortable to take patient home at this time.  Wife is encouraged to follow up with patient's PCP in 1-2 days for re evaluation and instructed to return patient to ED immediately should symptoms worsen or if she has any concern prior to this recommended follow up.  She is aware of plan and verbalizes her understanding.  Will discharge home at this time. Patient in ED s/p mechanical fall witnessed by wife today while walking on the treadmill.  Patient ambulating on arrival to ED without difficulty.  At baseline mentation as per wife at bedside.  Abrasions noted to hands - tetanus updated.  No pain or grimacing on palpation x 4 extremities.  No deformities.  ROM is noted limited.  CT head and cervical spine unremarkable for acute process.  X ray chest and pelvis also unremarkable.  Wounds are cleansed and abx ointment applied.  Dressing is placed and wife is educated on ongoing wound care.  Patient's wife at bedside is comfortable to take patient home at this time.  Wife is encouraged to follow up with patient's PCP in 1-2 days for re evaluation and instructed to return patient to ED immediately should symptoms worsen or if she has any concern prior to this recommended follow up.  She is aware of plan and verbalizes her understanding.  Will discharge home at this time.

## 2019-02-26 NOTE — ED PROVIDER NOTE - OBJECTIVE STATEMENT
73 year old male with history of mechanical heart valve (on coumadin), Dementia presents to ED with wife status post trip and fall today while walking at a low speed on the treadmill.  Wife at bedside notes this event was witnessed and patient did not experience any LOC.  He presents with concern for scattered abrasions to bilateral hands alone dorsal surface.  Wife notes patient is acting himself, at baseline mentation.  When questioned, patient denies any pain or injury.  Last tetanus is unknown.  Additional history is limited as patient does not provide much of his own history due to Dementia.

## 2019-02-26 NOTE — ED PROVIDER NOTE - NEUROLOGICAL, MLM
Alert, oriented to person (at baseline per wife at bedside), no focal deficits, no motor or sensory deficits.

## 2019-02-26 NOTE — ED ADULT NURSE NOTE - OBJECTIVE STATEMENT
Pt presents s/p fall while walking on treadmill today. Pt has baseline dementia, wife at bedside states that pt is exhibiting his normal mental status. Pt presents with redness to his chin and small lac to dorsal aspect of right hand. Fall was unwitnessed, wife states that pt likely removed his safety belt and fell off the back of the treadmill.

## 2019-02-26 NOTE — ED PROVIDER NOTE - MUSCULOSKELETAL, MLM
Spine appears normal, there is no grimacing on palpation to cervical, thoracic or lumbar spine.  No stepoff/deformity to spine.  range of motion is not limited, no muscle or joint tenderness

## 2019-02-26 NOTE — ED ADULT TRIAGE NOTE - CHIEF COMPLAINT QUOTE
Brought In by wife, as per wife patient was on treadmill and fell off hitting his chin and right hand. on coumadin. wife denies loc.

## 2019-02-28 ENCOUNTER — APPOINTMENT (OUTPATIENT)
Dept: HEART AND VASCULAR | Facility: CLINIC | Age: 74
End: 2019-02-28
Payer: MEDICARE

## 2019-02-28 PROCEDURE — 36415 COLL VENOUS BLD VENIPUNCTURE: CPT

## 2019-03-01 LAB
INR PPP: 3.34 RATIO
PT BLD: 39.9 SEC

## 2019-03-02 DIAGNOSIS — Z79.84 LONG TERM (CURRENT) USE OF ORAL HYPOGLYCEMIC DRUGS: ICD-10-CM
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
## 2019-03-02 DIAGNOSIS — S60.512A ABRASION OF LEFT HAND, INITIAL ENCOUNTER: ICD-10-CM

## 2019-03-02 DIAGNOSIS — S00.83XA CONTUSION OF OTHER PART OF HEAD, INITIAL ENCOUNTER: ICD-10-CM

## 2019-03-02 DIAGNOSIS — E11.9 TYPE 2 DIABETES MELLITUS WITHOUT COMPLICATIONS: ICD-10-CM

## 2019-03-02 DIAGNOSIS — S60.511A ABRASION OF RIGHT HAND, INITIAL ENCOUNTER: ICD-10-CM

## 2019-03-02 DIAGNOSIS — Z79.02 LONG TERM (CURRENT) USE OF ANTITHROMBOTICS/ANTIPLATELETS: ICD-10-CM
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
## 2019-03-02 DIAGNOSIS — Z23 ENCOUNTER FOR IMMUNIZATION: ICD-10-CM
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
## 2019-03-02 DIAGNOSIS — F03.90 UNSPECIFIED DEMENTIA WITHOUT BEHAVIORAL DISTURBANCE: ICD-10-CM
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
## 2019-03-08 ENCOUNTER — APPOINTMENT (OUTPATIENT)
Dept: OPHTHALMOLOGY | Facility: CLINIC | Age: 74
End: 2019-03-08

## 2019-03-15 ENCOUNTER — OUTPATIENT (OUTPATIENT)
Dept: OUTPATIENT SERVICES | Facility: HOSPITAL | Age: 74
LOS: 1 days | End: 2019-03-15

## 2019-03-15 ENCOUNTER — APPOINTMENT (OUTPATIENT)
Dept: OPHTHALMOLOGY | Facility: CLINIC | Age: 74
End: 2019-03-15

## 2019-03-15 DIAGNOSIS — H40.003 PREGLAUCOMA, UNSPECIFIED, BILATERAL: ICD-10-CM

## 2019-03-19 ENCOUNTER — RX RENEWAL (OUTPATIENT)
Age: 74
End: 2019-03-19

## 2019-03-20 ENCOUNTER — RX RENEWAL (OUTPATIENT)
Age: 74
End: 2019-03-20

## 2019-03-20 ENCOUNTER — APPOINTMENT (OUTPATIENT)
Dept: HEART AND VASCULAR | Facility: CLINIC | Age: 74
End: 2019-03-20
Payer: MEDICARE

## 2019-03-20 ENCOUNTER — TRANSCRIPTION ENCOUNTER (OUTPATIENT)
Age: 74
End: 2019-03-20

## 2019-03-20 PROCEDURE — 36415 COLL VENOUS BLD VENIPUNCTURE: CPT

## 2019-03-22 ENCOUNTER — RX RENEWAL (OUTPATIENT)
Age: 74
End: 2019-03-22

## 2019-03-22 LAB
INR PPP: 3.71 RATIO
PT BLD: 44 SEC

## 2019-03-22 RX ORDER — WARFARIN 2 MG/1
2 TABLET ORAL
Qty: 30 | Refills: 3 | Status: ACTIVE | COMMUNITY
Start: 2019-03-22 | End: 1900-01-01

## 2019-03-27 ENCOUNTER — APPOINTMENT (OUTPATIENT)
Dept: HEART AND VASCULAR | Facility: CLINIC | Age: 74
End: 2019-03-27
Payer: MEDICARE

## 2019-03-27 PROCEDURE — 36415 COLL VENOUS BLD VENIPUNCTURE: CPT

## 2019-03-28 LAB
INR PPP: 1.86 RATIO
PT BLD: 21.8 SEC

## 2019-04-01 ENCOUNTER — RESULT REVIEW (OUTPATIENT)
Age: 74
End: 2019-04-01

## 2019-04-02 ENCOUNTER — APPOINTMENT (OUTPATIENT)
Dept: HEART AND VASCULAR | Facility: CLINIC | Age: 74
End: 2019-04-02
Payer: MEDICARE

## 2019-04-02 VITALS
SYSTOLIC BLOOD PRESSURE: 118 MMHG | WEIGHT: 179 LBS | HEIGHT: 74 IN | HEART RATE: 43 BPM | DIASTOLIC BLOOD PRESSURE: 72 MMHG | BODY MASS INDEX: 22.97 KG/M2

## 2019-04-02 DIAGNOSIS — R05 COUGH: ICD-10-CM

## 2019-04-02 DIAGNOSIS — E11.51 TYPE 2 DIABETES MELLITUS WITH DIABETIC PERIPHERAL ANGIOPATHY W/OUT GANGRENE: ICD-10-CM

## 2019-04-02 DIAGNOSIS — Z86.39 PERSONAL HISTORY OF OTHER ENDOCRINE, NUTRITIONAL AND METABOLIC DISEASE: ICD-10-CM

## 2019-04-02 DIAGNOSIS — N42.9 DISORDER OF PROSTATE, UNSPECIFIED: ICD-10-CM

## 2019-04-02 DIAGNOSIS — Z00.00 ENCOUNTER FOR GENERAL ADULT MEDICAL EXAMINATION W/OUT ABNORMAL FINDINGS: ICD-10-CM

## 2019-04-02 PROCEDURE — 99214 OFFICE O/P EST MOD 30 MIN: CPT | Mod: 25

## 2019-04-02 PROCEDURE — 93306 TTE W/DOPPLER COMPLETE: CPT

## 2019-04-02 PROCEDURE — 36415 COLL VENOUS BLD VENIPUNCTURE: CPT

## 2019-04-02 PROCEDURE — 93000 ELECTROCARDIOGRAM COMPLETE: CPT

## 2019-04-02 RX ORDER — TAMSULOSIN HYDROCHLORIDE 0.4 MG/1
0.4 CAPSULE ORAL DAILY
Qty: 180 | Refills: 3 | Status: ACTIVE | COMMUNITY
Start: 2019-04-02 | End: 1900-01-01

## 2019-04-02 RX ORDER — FINASTERIDE 5 MG/1
5 TABLET, FILM COATED ORAL DAILY
Qty: 90 | Refills: 3 | Status: ACTIVE | COMMUNITY
Start: 2019-04-02 | End: 1900-01-01

## 2019-04-03 PROBLEM — E11.51 DIABETES MELLITUS WITH PERIPHERAL CIRCULATORY DISORDER: Status: ACTIVE | Noted: 2019-04-02

## 2019-04-03 PROBLEM — N42.9 PROSTATE DISORDER: Status: ACTIVE | Noted: 2019-04-03

## 2019-04-04 LAB
ANION GAP SERPL CALC-SCNC: 11 MMOL/L
APPEARANCE: CLEAR
APPEARANCE: CLEAR
BACTERIA: NEGATIVE
BASOPHILS # BLD AUTO: 0.09 K/UL
BASOPHILS NFR BLD AUTO: 1.3 %
BILIRUBIN URINE: NEGATIVE
BILIRUBIN URINE: NEGATIVE
BLOOD URINE: NEGATIVE
BLOOD URINE: NEGATIVE
BUN SERPL-MCNC: 20 MG/DL
CALCIUM SERPL-MCNC: 9.1 MG/DL
CHLORIDE SERPL-SCNC: 104 MMOL/L
CHOLEST SERPL-MCNC: 119 MG/DL
CHOLEST/HDLC SERPL: 3.3 RATIO
CO2 SERPL-SCNC: 28 MMOL/L
COLOR: YELLOW
COLOR: YELLOW
CREAT SERPL-MCNC: 1.09 MG/DL
EOSINOPHIL # BLD AUTO: 0.37 K/UL
EOSINOPHIL NFR BLD AUTO: 5.2 %
GLUCOSE QUALITATIVE U: NEGATIVE
GLUCOSE QUALITATIVE U: NEGATIVE
GLUCOSE SERPL-MCNC: 95 MG/DL
HCT VFR BLD CALC: 45.8 %
HDLC SERPL-MCNC: 36 MG/DL
HGB BLD-MCNC: 14.5 G/DL
HYALINE CASTS: 0 /LPF
IMM GRANULOCYTES NFR BLD AUTO: 0.4 %
KETONES URINE: NEGATIVE
KETONES URINE: NEGATIVE
LDLC SERPL CALC-MCNC: 53 MG/DL
LEUKOCYTE ESTERASE URINE: NEGATIVE
LEUKOCYTE ESTERASE URINE: NEGATIVE
LYMPHOCYTES # BLD AUTO: 1.39 K/UL
LYMPHOCYTES NFR BLD AUTO: 19.5 %
MAN DIFF?: NORMAL
MCHC RBC-ENTMCNC: 31.7 GM/DL
MCHC RBC-ENTMCNC: 31.7 PG
MCV RBC AUTO: 100 FL
MICROSCOPIC-UA: NORMAL
MONOCYTES # BLD AUTO: 0.81 K/UL
MONOCYTES NFR BLD AUTO: 11.3 %
NEUTROPHILS # BLD AUTO: 4.45 K/UL
NEUTROPHILS NFR BLD AUTO: 62.3 %
NITRITE URINE: NEGATIVE
NITRITE URINE: NEGATIVE
PH URINE: 6
PH URINE: 6
PLATELET # BLD AUTO: 124 K/UL
POTASSIUM SERPL-SCNC: 5.1 MMOL/L
PROTEIN URINE: NEGATIVE
PROTEIN URINE: NEGATIVE
RBC # BLD: 4.58 M/UL
RBC # FLD: 13.3 %
RED BLOOD CELLS URINE: 0 /HPF
SODIUM SERPL-SCNC: 143 MMOL/L
SPECIFIC GRAVITY URINE: 1.02
SPECIFIC GRAVITY URINE: 1.02
SQUAMOUS EPITHELIAL CELLS: 0 /HPF
TRIGL SERPL-MCNC: 150 MG/DL
TSH SERPL-ACNC: 1.73 UIU/ML
UROBILINOGEN URINE: NORMAL
UROBILINOGEN URINE: NORMAL
WBC # FLD AUTO: 7.14 K/UL
WHITE BLOOD CELLS URINE: 1 /HPF

## 2019-04-05 NOTE — HISTORY OF PRESENT ILLNESS
[FreeTextEntry1] : The patient feels tired walking and it is hard to lift his feet. He is not apparently dyspneic. The patient had been on Flomax twice a day but is now on once a day. His wife feels that he has a large volume of urine. He has hesitancy. He has frequency for a few weeks. He has not had a fever. He has not had a large fluid intake.Using cough from his sputum. It is unchanged. He is fasting.

## 2019-04-05 NOTE — DISCUSSION/SUMMARY
[FreeTextEntry1] : The patient has prostatism. He will increase his tamsulosin and start finasteride. He has a loss of energy. This is related to his dementia. The patient has had a cough for a month. His platelet count is reduced.The xray didn't show any significant abnormality. The wife declines a hematology consult.

## 2019-04-05 NOTE — PHYSICAL EXAM
[General Appearance - Well Developed] : well developed [Normal Appearance] : normal appearance [Well Groomed] : well groomed [General Appearance - Well Nourished] : well nourished [No Deformities] : no deformities [General Appearance - In No Acute Distress] : no acute distress [Normal Conjunctiva] : the conjunctiva exhibited no abnormalities [Eyelids - No Xanthelasma] : the eyelids demonstrated no xanthelasmas [Normal Oral Mucosa] : normal oral mucosa [No Oral Pallor] : no oral pallor [No Oral Cyanosis] : no oral cyanosis [Normal Jugular Venous A Waves Present] : normal jugular venous A waves present [Normal Jugular Venous V Waves Present] : normal jugular venous V waves present [No Jugular Venous Paul A Waves] : no jugular venous paul A waves [Respiration, Rhythm And Depth] : normal respiratory rhythm and effort [Exaggerated Use Of Accessory Muscles For Inspiration] : no accessory muscle use [Auscultation Breath Sounds / Voice Sounds] : lungs were clear to auscultation bilaterally [Heart Rate And Rhythm] : heart rate and rhythm were normal [Heart Sounds] : normal S1 and S2 [Murmurs] : no murmurs present [Abdomen Soft] : soft [Abdomen Tenderness] : non-tender [Abdomen Mass (___ Cm)] : no abdominal mass palpated [Abnormal Walk] : normal gait [Gait - Sufficient For Exercise Testing] : the gait was sufficient for exercise testing [Nail Clubbing] : no clubbing of the fingernails [Cyanosis, Localized] : no localized cyanosis [Petechial Hemorrhages (___cm)] : no petechial hemorrhages [Skin Color & Pigmentation] : normal skin color and pigmentation [] : no rash [No Venous Stasis] : no venous stasis [Skin Lesions] : no skin lesions [No Skin Ulcers] : no skin ulcer [No Xanthoma] : no  xanthoma was observed [FreeTextEntry1] : Motor 5/5. Sensory intact. Cerebellar intact. Cranial nerves intact.

## 2019-04-19 ENCOUNTER — APPOINTMENT (OUTPATIENT)
Dept: HEART AND VASCULAR | Facility: CLINIC | Age: 74
End: 2019-04-19
Payer: MEDICARE

## 2019-04-19 PROCEDURE — 36415 COLL VENOUS BLD VENIPUNCTURE: CPT

## 2019-04-22 ENCOUNTER — RESULT REVIEW (OUTPATIENT)
Age: 74
End: 2019-04-22

## 2019-04-26 ENCOUNTER — RESULT REVIEW (OUTPATIENT)
Age: 74
End: 2019-04-26

## 2019-05-02 LAB
INR PPP: 3.61 RATIO
PT BLD: 42.4 SEC

## 2019-05-09 ENCOUNTER — APPOINTMENT (OUTPATIENT)
Dept: HEART AND VASCULAR | Facility: CLINIC | Age: 74
End: 2019-05-09
Payer: MEDICARE

## 2019-05-09 PROCEDURE — 36415 COLL VENOUS BLD VENIPUNCTURE: CPT

## 2019-05-10 ENCOUNTER — RESULT REVIEW (OUTPATIENT)
Age: 74
End: 2019-05-10

## 2019-05-10 LAB
INR PPP: 4.4 RATIO
PT BLD: 52 SEC

## 2019-05-20 ENCOUNTER — APPOINTMENT (OUTPATIENT)
Dept: HEART AND VASCULAR | Facility: CLINIC | Age: 74
End: 2019-05-20
Payer: MEDICARE

## 2019-05-20 PROCEDURE — 36415 COLL VENOUS BLD VENIPUNCTURE: CPT

## 2019-05-21 ENCOUNTER — RESULT REVIEW (OUTPATIENT)
Age: 74
End: 2019-05-21

## 2019-05-22 LAB
INR PPP: 2.29 RATIO
PT BLD: 26.6 SEC

## 2019-05-29 ENCOUNTER — APPOINTMENT (OUTPATIENT)
Dept: HEART AND VASCULAR | Facility: CLINIC | Age: 74
End: 2019-05-29
Payer: MEDICARE

## 2019-05-29 PROCEDURE — 36415 COLL VENOUS BLD VENIPUNCTURE: CPT

## 2019-05-30 LAB
INR PPP: 2.29 RATIO
PT BLD: 26.8 SEC

## 2019-06-12 ENCOUNTER — APPOINTMENT (OUTPATIENT)
Dept: HEART AND VASCULAR | Facility: CLINIC | Age: 74
End: 2019-06-12
Payer: MEDICARE

## 2019-06-12 VITALS
OXYGEN SATURATION: 98 % | DIASTOLIC BLOOD PRESSURE: 60 MMHG | BODY MASS INDEX: 23.49 KG/M2 | HEIGHT: 74 IN | WEIGHT: 183 LBS | SYSTOLIC BLOOD PRESSURE: 104 MMHG | HEART RATE: 62 BPM

## 2019-06-12 DIAGNOSIS — R60.9 EDEMA, UNSPECIFIED: ICD-10-CM

## 2019-06-12 DIAGNOSIS — R29.898 OTHER SYMPTOMS AND SIGNS INVOLVING THE MUSCULOSKELETAL SYSTEM: ICD-10-CM

## 2019-06-12 DIAGNOSIS — Z95.2 PRESENCE OF PROSTHETIC HEART VALVE: ICD-10-CM

## 2019-06-12 DIAGNOSIS — F03.90 UNSPECIFIED DEMENTIA W/OUT BEHAVIORAL DISTURBANCE: ICD-10-CM

## 2019-06-12 PROCEDURE — 99214 OFFICE O/P EST MOD 30 MIN: CPT | Mod: 25

## 2019-06-12 PROCEDURE — 93000 ELECTROCARDIOGRAM COMPLETE: CPT

## 2019-06-12 NOTE — DISCUSSION/SUMMARY
[FreeTextEntry1] : The patient has difficulty walking. He does not appear dyspneic. His peripheral pulses are normal. He will return for an KRYSTA. He has deteriorated mentally. He will see the neurologist. The patient has edema. He will reduce his salt intake. He will continue his other medication.

## 2019-06-12 NOTE — PHYSICAL EXAM
[General Appearance - Well Developed] : well developed [General Appearance - Well Nourished] : well nourished [Normal Appearance] : normal appearance [Well Groomed] : well groomed [Normal Conjunctiva] : the conjunctiva exhibited no abnormalities [General Appearance - In No Acute Distress] : no acute distress [No Deformities] : no deformities [Eyelids - No Xanthelasma] : the eyelids demonstrated no xanthelasmas [Normal Oral Mucosa] : normal oral mucosa [No Oral Cyanosis] : no oral cyanosis [No Oral Pallor] : no oral pallor [Normal Jugular Venous A Waves Present] : normal jugular venous A waves present [No Jugular Venous Paul A Waves] : no jugular venous paul A waves [Normal Jugular Venous V Waves Present] : normal jugular venous V waves present [Auscultation Breath Sounds / Voice Sounds] : lungs were clear to auscultation bilaterally [Exaggerated Use Of Accessory Muscles For Inspiration] : no accessory muscle use [Respiration, Rhythm And Depth] : normal respiratory rhythm and effort [Heart Sounds] : normal S1 and S2 [Heart Rate And Rhythm] : heart rate and rhythm were normal [Murmurs] : no murmurs present [Abdomen Soft] : soft [Abdomen Tenderness] : non-tender [Abdomen Mass (___ Cm)] : no abdominal mass palpated [Gait - Sufficient For Exercise Testing] : the gait was sufficient for exercise testing [Abnormal Walk] : normal gait [Cyanosis, Localized] : no localized cyanosis [Nail Clubbing] : no clubbing of the fingernails [Skin Color & Pigmentation] : normal skin color and pigmentation [Petechial Hemorrhages (___cm)] : no petechial hemorrhages [No Venous Stasis] : no venous stasis [] : no rash [No Skin Ulcers] : no skin ulcer [Skin Lesions] : no skin lesions [FreeTextEntry1] : Motor 5/5. Sensory intact. Cerebellar intact. Cranial nerves intact. [No Xanthoma] : no  xanthoma was observed

## 2019-06-12 NOTE — HISTORY OF PRESENT ILLNESS
[FreeTextEntry1] : The patient has had increasing edema. He has also had fatigue. He describes an inability to continue walking after 3-4 blocks. He does not appear dyspneic. He has been incontinent. His mental status has worsened. He denies pain. His gait is slow.

## 2019-06-13 ENCOUNTER — RESULT REVIEW (OUTPATIENT)
Age: 74
End: 2019-06-13

## 2019-06-13 LAB
INR PPP: 2.32 RATIO
PT BLD: 27.1 SEC

## 2019-06-24 ENCOUNTER — INPATIENT (INPATIENT)
Facility: HOSPITAL | Age: 74
LOS: 2 days | Discharge: HOME | DRG: 948 | End: 2019-06-27
Payer: MEDICARE

## 2019-06-24 VITALS
HEIGHT: 74 IN | WEIGHT: 160.06 LBS | RESPIRATION RATE: 18 BRPM | TEMPERATURE: 98 F | DIASTOLIC BLOOD PRESSURE: 75 MMHG | SYSTOLIC BLOOD PRESSURE: 125 MMHG | OXYGEN SATURATION: 96 % | HEART RATE: 55 BPM

## 2019-06-24 DIAGNOSIS — R00.1 BRADYCARDIA, UNSPECIFIED: ICD-10-CM

## 2019-06-24 DIAGNOSIS — Z98.890 OTHER SPECIFIED POSTPROCEDURAL STATES: Chronic | ICD-10-CM

## 2019-06-24 DIAGNOSIS — Z95.2 PRESENCE OF PROSTHETIC HEART VALVE: ICD-10-CM

## 2019-06-24 DIAGNOSIS — G30.9 ALZHEIMER'S DISEASE, UNSPECIFIED: ICD-10-CM

## 2019-06-24 DIAGNOSIS — N40.0 BENIGN PROSTATIC HYPERPLASIA WITHOUT LOWER URINARY TRACT SYMPTOMS: ICD-10-CM

## 2019-06-24 DIAGNOSIS — E11.9 TYPE 2 DIABETES MELLITUS WITHOUT COMPLICATIONS: ICD-10-CM

## 2019-06-24 DIAGNOSIS — I48.91 UNSPECIFIED ATRIAL FIBRILLATION: ICD-10-CM

## 2019-06-24 DIAGNOSIS — Z91.89 OTHER SPECIFIED PERSONAL RISK FACTORS, NOT ELSEWHERE CLASSIFIED: ICD-10-CM

## 2019-06-24 DIAGNOSIS — Z95.2 PRESENCE OF PROSTHETIC HEART VALVE: Chronic | ICD-10-CM

## 2019-06-24 DIAGNOSIS — R53.1 WEAKNESS: ICD-10-CM

## 2019-06-24 DIAGNOSIS — R63.8 OTHER SYMPTOMS AND SIGNS CONCERNING FOOD AND FLUID INTAKE: ICD-10-CM

## 2019-06-24 DIAGNOSIS — Z29.9 ENCOUNTER FOR PROPHYLACTIC MEASURES, UNSPECIFIED: ICD-10-CM

## 2019-06-24 LAB
ALBUMIN SERPL ELPH-MCNC: 3.7 G/DL — SIGNIFICANT CHANGE UP (ref 3.3–5)
ALP SERPL-CCNC: 83 U/L — SIGNIFICANT CHANGE UP (ref 40–120)
ALT FLD-CCNC: 15 U/L — SIGNIFICANT CHANGE UP (ref 10–45)
ANION GAP SERPL CALC-SCNC: 12 MMOL/L — SIGNIFICANT CHANGE UP (ref 5–17)
APPEARANCE UR: CLEAR — SIGNIFICANT CHANGE UP
APTT BLD: 35.2 SEC — SIGNIFICANT CHANGE UP (ref 27.5–36.3)
AST SERPL-CCNC: 14 U/L — SIGNIFICANT CHANGE UP (ref 10–40)
BACTERIA # UR AUTO: PRESENT /HPF
BASOPHILS # BLD AUTO: 0.07 K/UL — SIGNIFICANT CHANGE UP (ref 0–0.2)
BASOPHILS NFR BLD AUTO: 0.6 % — SIGNIFICANT CHANGE UP (ref 0–2)
BILIRUB SERPL-MCNC: 0.8 MG/DL — SIGNIFICANT CHANGE UP (ref 0.2–1.2)
BILIRUB UR-MCNC: NEGATIVE — SIGNIFICANT CHANGE UP
BUN SERPL-MCNC: 14 MG/DL — SIGNIFICANT CHANGE UP (ref 7–23)
CALCIUM SERPL-MCNC: 9.1 MG/DL — SIGNIFICANT CHANGE UP (ref 8.4–10.5)
CHLORIDE SERPL-SCNC: 104 MMOL/L — SIGNIFICANT CHANGE UP (ref 96–108)
CO2 SERPL-SCNC: 23 MMOL/L — SIGNIFICANT CHANGE UP (ref 22–31)
COLOR SPEC: YELLOW — SIGNIFICANT CHANGE UP
CREAT SERPL-MCNC: 0.83 MG/DL — SIGNIFICANT CHANGE UP (ref 0.5–1.3)
DIFF PNL FLD: ABNORMAL
EOSINOPHIL # BLD AUTO: 0.18 K/UL — SIGNIFICANT CHANGE UP (ref 0–0.5)
EOSINOPHIL NFR BLD AUTO: 1.6 % — SIGNIFICANT CHANGE UP (ref 0–6)
EPI CELLS # UR: SIGNIFICANT CHANGE UP /HPF (ref 0–5)
GLUCOSE SERPL-MCNC: 130 MG/DL — HIGH (ref 70–99)
GLUCOSE UR QL: NEGATIVE — SIGNIFICANT CHANGE UP
HCT VFR BLD CALC: 46.7 % — SIGNIFICANT CHANGE UP (ref 39–50)
HGB BLD-MCNC: 15.1 G/DL — SIGNIFICANT CHANGE UP (ref 13–17)
IMM GRANULOCYTES NFR BLD AUTO: 0.3 % — SIGNIFICANT CHANGE UP (ref 0–1.5)
INR BLD: 2.29 — HIGH (ref 0.88–1.16)
KETONES UR-MCNC: ABNORMAL MG/DL
LEUKOCYTE ESTERASE UR-ACNC: NEGATIVE — SIGNIFICANT CHANGE UP
LYMPHOCYTES # BLD AUTO: 1.34 K/UL — SIGNIFICANT CHANGE UP (ref 1–3.3)
LYMPHOCYTES # BLD AUTO: 11.6 % — LOW (ref 13–44)
MCHC RBC-ENTMCNC: 31.3 PG — SIGNIFICANT CHANGE UP (ref 27–34)
MCHC RBC-ENTMCNC: 32.3 GM/DL — SIGNIFICANT CHANGE UP (ref 32–36)
MCV RBC AUTO: 96.9 FL — SIGNIFICANT CHANGE UP (ref 80–100)
MONOCYTES # BLD AUTO: 1.43 K/UL — HIGH (ref 0–0.9)
MONOCYTES NFR BLD AUTO: 12.4 % — SIGNIFICANT CHANGE UP (ref 2–14)
NEUTROPHILS # BLD AUTO: 8.49 K/UL — HIGH (ref 1.8–7.4)
NEUTROPHILS NFR BLD AUTO: 73.5 % — SIGNIFICANT CHANGE UP (ref 43–77)
NITRITE UR-MCNC: NEGATIVE — SIGNIFICANT CHANGE UP
NRBC # BLD: 0 /100 WBCS — SIGNIFICANT CHANGE UP (ref 0–0)
PH UR: 6 — SIGNIFICANT CHANGE UP (ref 5–8)
PLATELET # BLD AUTO: 141 K/UL — LOW (ref 150–400)
POTASSIUM SERPL-MCNC: 4.2 MMOL/L — SIGNIFICANT CHANGE UP (ref 3.5–5.3)
POTASSIUM SERPL-SCNC: 4.2 MMOL/L — SIGNIFICANT CHANGE UP (ref 3.5–5.3)
PROT SERPL-MCNC: 6.8 G/DL — SIGNIFICANT CHANGE UP (ref 6–8.3)
PROT UR-MCNC: NEGATIVE MG/DL — SIGNIFICANT CHANGE UP
PROTHROM AB SERPL-ACNC: 26.6 SEC — HIGH (ref 10–12.9)
RBC # BLD: 4.82 M/UL — SIGNIFICANT CHANGE UP (ref 4.2–5.8)
RBC # FLD: 12.6 % — SIGNIFICANT CHANGE UP (ref 10.3–14.5)
RBC CASTS # UR COMP ASSIST: < 5 /HPF — SIGNIFICANT CHANGE UP
SODIUM SERPL-SCNC: 139 MMOL/L — SIGNIFICANT CHANGE UP (ref 135–145)
SP GR SPEC: 1.02 — SIGNIFICANT CHANGE UP (ref 1–1.03)
UROBILINOGEN FLD QL: 0.2 E.U./DL — SIGNIFICANT CHANGE UP
WBC # BLD: 11.54 K/UL — HIGH (ref 3.8–10.5)
WBC # FLD AUTO: 11.54 K/UL — HIGH (ref 3.8–10.5)
WBC UR QL: < 5 /HPF — SIGNIFICANT CHANGE UP

## 2019-06-24 PROCEDURE — 71045 X-RAY EXAM CHEST 1 VIEW: CPT | Mod: 26

## 2019-06-24 PROCEDURE — 70450 CT HEAD/BRAIN W/O DYE: CPT | Mod: 26

## 2019-06-24 PROCEDURE — 99223 1ST HOSP IP/OBS HIGH 75: CPT | Mod: GC

## 2019-06-24 PROCEDURE — 93010 ELECTROCARDIOGRAM REPORT: CPT

## 2019-06-24 PROCEDURE — 74177 CT ABD & PELVIS W/CONTRAST: CPT | Mod: 26

## 2019-06-24 PROCEDURE — 99285 EMERGENCY DEPT VISIT HI MDM: CPT | Mod: 25

## 2019-06-24 RX ORDER — ESCITALOPRAM OXALATE 10 MG/1
10 TABLET, FILM COATED ORAL DAILY
Refills: 0 | Status: DISCONTINUED | OUTPATIENT
Start: 2019-06-24 | End: 2019-06-27

## 2019-06-24 RX ORDER — DIVALPROEX SODIUM 500 MG/1
250 TABLET, DELAYED RELEASE ORAL
Refills: 0 | Status: DISCONTINUED | OUTPATIENT
Start: 2019-06-24 | End: 2019-06-27

## 2019-06-24 RX ORDER — METFORMIN HYDROCHLORIDE 850 MG/1
0 TABLET ORAL
Qty: 0 | Refills: 0 | DISCHARGE

## 2019-06-24 RX ORDER — SODIUM CHLORIDE 9 MG/ML
500 INJECTION INTRAMUSCULAR; INTRAVENOUS; SUBCUTANEOUS ONCE
Refills: 0 | Status: COMPLETED | OUTPATIENT
Start: 2019-06-24 | End: 2019-06-24

## 2019-06-24 RX ORDER — LATANOPROST 0.05 MG/ML
1 SOLUTION/ DROPS OPHTHALMIC; TOPICAL AT BEDTIME
Refills: 0 | Status: DISCONTINUED | OUTPATIENT
Start: 2019-06-24 | End: 2019-06-27

## 2019-06-24 RX ORDER — DORZOLAMIDE HYDROCHLORIDE 20 MG/ML
1 SOLUTION/ DROPS OPHTHALMIC THREE TIMES A DAY
Refills: 0 | Status: DISCONTINUED | OUTPATIENT
Start: 2019-06-24 | End: 2019-06-27

## 2019-06-24 RX ORDER — ATORVASTATIN CALCIUM 80 MG/1
1 TABLET, FILM COATED ORAL
Qty: 0 | Refills: 0 | DISCHARGE

## 2019-06-24 RX ORDER — TAMSULOSIN HYDROCHLORIDE 0.4 MG/1
1 CAPSULE ORAL
Qty: 0 | Refills: 0 | DISCHARGE

## 2019-06-24 RX ORDER — MEMANTINE HYDROCHLORIDE 10 MG/1
10 TABLET ORAL
Refills: 0 | Status: DISCONTINUED | OUTPATIENT
Start: 2019-06-24 | End: 2019-06-27

## 2019-06-24 RX ORDER — TAMSULOSIN HYDROCHLORIDE 0.4 MG/1
0.4 CAPSULE ORAL
Refills: 0 | Status: DISCONTINUED | OUTPATIENT
Start: 2019-06-24 | End: 2019-06-27

## 2019-06-24 RX ORDER — WARFARIN SODIUM 2.5 MG/1
3 TABLET ORAL ONCE
Refills: 0 | Status: COMPLETED | OUTPATIENT
Start: 2019-06-24 | End: 2019-06-24

## 2019-06-24 RX ORDER — ATORVASTATIN CALCIUM 80 MG/1
20 TABLET, FILM COATED ORAL AT BEDTIME
Refills: 0 | Status: DISCONTINUED | OUTPATIENT
Start: 2019-06-24 | End: 2019-06-27

## 2019-06-24 RX ORDER — LATANOPROST 0.05 MG/ML
1 SOLUTION/ DROPS OPHTHALMIC; TOPICAL
Qty: 0 | Refills: 0 | DISCHARGE

## 2019-06-24 RX ORDER — DORZOLAMIDE HYDROCHLORIDE 20 MG/ML
0 SOLUTION/ DROPS OPHTHALMIC
Qty: 0 | Refills: 0 | DISCHARGE

## 2019-06-24 RX ORDER — DONEPEZIL HYDROCHLORIDE 10 MG/1
10 TABLET, FILM COATED ORAL AT BEDTIME
Refills: 0 | Status: DISCONTINUED | OUTPATIENT
Start: 2019-06-24 | End: 2019-06-27

## 2019-06-24 RX ORDER — ASPIRIN/CALCIUM CARB/MAGNESIUM 324 MG
81 TABLET ORAL AT BEDTIME
Refills: 0 | Status: DISCONTINUED | OUTPATIENT
Start: 2019-06-24 | End: 2019-06-27

## 2019-06-24 RX ORDER — MEMANTINE HYDROCHLORIDE AND DONEPEZIL HYDROCHLORIDE 21; 10 MG/1; MG/1
1 CAPSULE ORAL
Qty: 0 | Refills: 0 | DISCHARGE

## 2019-06-24 RX ORDER — ESCITALOPRAM OXALATE 10 MG/1
1 TABLET, FILM COATED ORAL
Qty: 0 | Refills: 0 | DISCHARGE

## 2019-06-24 RX ORDER — SODIUM CHLORIDE 9 MG/ML
1000 INJECTION INTRAMUSCULAR; INTRAVENOUS; SUBCUTANEOUS
Refills: 0 | Status: DISCONTINUED | OUTPATIENT
Start: 2019-06-24 | End: 2019-06-27

## 2019-06-24 RX ORDER — LANOLIN ALCOHOL/MO/W.PET/CERES
3 CREAM (GRAM) TOPICAL AT BEDTIME
Refills: 0 | Status: DISCONTINUED | OUTPATIENT
Start: 2019-06-24 | End: 2019-06-27

## 2019-06-24 RX ORDER — DIVALPROEX SODIUM 500 MG/1
1 TABLET, DELAYED RELEASE ORAL
Qty: 0 | Refills: 0 | DISCHARGE

## 2019-06-24 RX ADMIN — SODIUM CHLORIDE 500 MILLILITER(S): 9 INJECTION INTRAMUSCULAR; INTRAVENOUS; SUBCUTANEOUS at 15:22

## 2019-06-24 RX ADMIN — DORZOLAMIDE HYDROCHLORIDE 1 DROP(S): 20 SOLUTION/ DROPS OPHTHALMIC at 23:33

## 2019-06-24 RX ADMIN — Medication 81 MILLIGRAM(S): at 23:31

## 2019-06-24 RX ADMIN — ATORVASTATIN CALCIUM 20 MILLIGRAM(S): 80 TABLET, FILM COATED ORAL at 23:31

## 2019-06-24 RX ADMIN — DONEPEZIL HYDROCHLORIDE 10 MILLIGRAM(S): 10 TABLET, FILM COATED ORAL at 23:31

## 2019-06-24 RX ADMIN — TAMSULOSIN HYDROCHLORIDE 0.4 MILLIGRAM(S): 0.4 CAPSULE ORAL at 23:31

## 2019-06-24 RX ADMIN — LATANOPROST 1 DROP(S): 0.05 SOLUTION/ DROPS OPHTHALMIC; TOPICAL at 23:33

## 2019-06-24 RX ADMIN — Medication 3 MILLIGRAM(S): at 23:31

## 2019-06-24 RX ADMIN — SODIUM CHLORIDE 80 MILLILITER(S): 9 INJECTION INTRAMUSCULAR; INTRAVENOUS; SUBCUTANEOUS at 15:55

## 2019-06-24 RX ADMIN — WARFARIN SODIUM 3 MILLIGRAM(S): 2.5 TABLET ORAL at 23:31

## 2019-06-24 NOTE — H&P ADULT - PROBLEM SELECTOR PLAN 10
1) PCP Contacted on Admission: (Y/N) --> Name & Phone #: Dr. Asia Delaney [phone]  2) Date of Contact with PCP:  3) PCP Contacted at Discharge: (Y/N, N/A)  4) Summary of Handoff Given to PCP:   5) Post-Discharge Appointment Date and Location:

## 2019-06-24 NOTE — H&P ADULT - PROBLEM SELECTOR PLAN 4
Patient has known history of paroxsymal AFib.  - holding acebutolol in setting of bradycardia  - c/w coumadin

## 2019-06-24 NOTE — H&P ADULT - PROBLEM SELECTOR PLAN 1
Pt noted to have 1 day of weakness and lethargy per wife and son which has improved since pt was brought to the hospital. Vitals, UA, CXR unremarkable for signs of infection. TSH wnl. CTH unremarkable for hemorrhage or infarct. CTA/P with no acute pathology. Etiology includes transient reaction to Shingrex vaccine vs arterial insufficiency vs worsening dementia.  - PT consult  - Continue to monitor for changes in mental status

## 2019-06-24 NOTE — ED ADULT NURSE NOTE - NSIMPLEMENTINTERV_GEN_ALL_ED
Implemented All Fall with Harm Risk Interventions:  [place] to call system. Call bell, personal items and telephone within reach. Instruct patient to call for assistance. Room bathroom lighting operational. Non-slip footwear when patient is off stretcher. Physically safe environment: no spills, clutter or unnecessary equipment. Stretcher in lowest position, wheels locked, appropriate side rails in place. Provide visual cue, wrist band, yellow gown, etc. Monitor gait and stability. Monitor for mental status changes and reorient to person, place, and time. Review medications for side effects contributing to fall risk. Reinforce activity limits and safety measures with patient and family. Provide visual clues: red socks.

## 2019-06-24 NOTE — H&P ADULT - PROBLEM SELECTOR PLAN 7
Patient noted to have bradycardia with HR in the 40s. BP remain wnl. ROS difficult to elicit given pt's dementia. Pt noted to have sinus bradycardia on [employer] records in outpatient cardiologist's office on 2017 EKG.  - continue to hold home Acebutolol  - avoid AV blocking agents

## 2019-06-24 NOTE — PATIENT PROFILE ADULT - NSPROMUTINFOINDIVIDFT_GEN_A_NUR
Per Patient's wife, "he is a nice person who gets fiesty when he is backed into a corner or if he is fearful or hurting".

## 2019-06-24 NOTE — H&P ADULT - HISTORY OF PRESENT ILLNESS
Patient is a 74yoM with advanced Alzheimers dementia, DM, Afib, AVR (on Coumadin) and BPH who presents from home to [facility] ED with increased lethargy and weakness. Patient is a 74yoM with advanced Alzheimers dementia, DM, Afib, AVR (on Coumadin) and BPH who presents from home to [facility] ED with increased lethargy and weakness. Patient presents with his wife and at bedside. History is per wife. Wife reports that on Saturday, pt received his second dose of Shingrex vaccine then on Sunday, he had increased lethargy and weakness. Wife states that the pt would not reason and was very sleepy. She describes him as being "semi-comatose." Pt's wife also reports that pt had reduced PO intake over the last day. Pt's wife states that since coming to the hospital, pt has been more awake and alert than the previous day. Pt's wife reports that at baseline he ambulates with a walker, follows simple commands and acknowledges people in conversation. Pt's wife is pt's primary caregiver. She is concerned that patient may need hospice care. She does not wish to have her  treated with antibiotics and states that he is DNR/DNI. Of note, pt's wife reports that she was walking with the pt last week and he tripped over a hose on the ground and fell onto his right side onto a short fence. Wife denies head trauma or loss of consciousness and states that the patient was able to get up and ambulate after the event.

## 2019-06-24 NOTE — H&P ADULT - NSHPLABSRESULTS_GEN_ALL_CORE
LABS:                         15.1   11.54 )-----------( 141      ( 2019 09:29 )             46.7         139  |  104  |  14  ----------------------------<  130<H>  4.2   |  23  |  0.83    Ca    9.1      2019 09:29    TPro  6.8  /  Alb  3.7  /  TBili  0.8  /  DBili  x   /  AST  14  /  ALT  15  /  AlkPhos  83      PT/INR - ( 2019 09:29 )   PT: 26.6 sec;   INR: 2.29          PTT - ( 2019 09:29 )  PTT:35.2 sec  Urinalysis Basic - ( 2019 11:01 )    Color: Yellow / Appearance: Clear / S.025 / pH: x  Gluc: x / Ketone: Trace mg/dL  / Bili: Negative / Urobili: 0.2 E.U./dL   Blood: x / Protein: NEGATIVE mg/dL / Nitrite: NEGATIVE   Leuk Esterase: NEGATIVE / RBC: < 5 /HPF / WBC < 5 /HPF   Sq Epi: x / Non Sq Epi: 0-5 /HPF / Bacteria: Present /HPF      RADIOLOGY, EKG & ADDITIONAL TESTS: Reviewed.  < from: CT Head No Cont (19 @ 14:25) >    IMPRESSION: No intracranial hemorrhage or acute transcortical infarct.   Stable exam.    < from: Xray Chest 1 View AP/PA (19 @ 11:26) >    Impression:  Lungs clear with no infiltrate or pleural effusion. Postoperative change.   No pneumothorax. No acute abnormality. Similar appearance to prior exam   2019.    < from: CT Abdomen and Pelvis w/ IV Cont (19 @ 11:23) >    IMPRESSION:  No acute abdominal/pelvic pathology.

## 2019-06-24 NOTE — PATIENT PROFILE ADULT - VISION (WITH CORRECTIVE LENSES IF THE PATIENT USUALLY WEARS THEM):
Has glaucoma/Normal vision: sees adequately in most situations; can see medication labels, newsprint

## 2019-06-24 NOTE — PATIENT PROFILE ADULT - NSPROGENBLOODRESTRICT_GEN_A_NUR
Patient is DNR and DNI. Patient's spouse wants comfort measures. Awaiting Pallative team consult in am.

## 2019-06-24 NOTE — H&P ADULT - NSICDXPASTMEDICALHX_GEN_ALL_CORE_FT
PAST MEDICAL HISTORY:  Afib     Alzheimer disease     Aortic valve replaced     BPH (benign prostatic hyperplasia)     Diabetes     Melanoma s/p excision

## 2019-06-24 NOTE — H&P ADULT - NSHPPHYSICALEXAM_GEN_ALL_CORE
VITAL SIGNS:  T(C): 36.6 (06-24-19 @ 15:34), Max: 36.6 (06-24-19 @ 09:01)  T(F): 97.9 (06-24-19 @ 15:34), Max: 97.9 (06-24-19 @ 15:34)  HR: 46 (06-24-19 @ 15:34) (46 - 55)  BP: 138/68 (06-24-19 @ 15:34) (125/75 - 141/72)  BP(mean): --  RR: 17 (06-24-19 @ 15:34) (16 - 18)  SpO2: 93% (06-24-19 @ 15:34) (93% - 96%)  Wt(kg): --    PHYSICAL EXAM:  Constitutional: WDWN resting comfortably in bed; NAD, pleasant, awake and alert, not speaking  Head: NC/AT  Eyes: PERRL, EOMI, anicteric sclera  ENT: no nasal discharge; uvula midline, no oropharyngeal erythema or exudates; MMM  Neck: supple; no JVD or thyromegaly  Respiratory: mild L sided basilar crackles, otherwise CTA B/L; no W/R/R, no retractions, breathing comofrtably on RA  Cardiac: +S1/S2; regular rhythm, bradycardic, no M/R/G; PMI non-displaced  Gastrointestinal: abdomen soft, NT/ND; no rebound or guarding; +BSx4  Back: spine midline, no bony tenderness or step-offs  Extremities: WWP, no clubbing or cyanosis; b/l LE pitting edema  Musculoskeletal: NROM x4; no joint swelling, tenderness or erythema  Vascular: 2+ radial, femoral, DP/PT pulses B/L  Dermatologic: skin warm, dry and intact; ecchymoses noted on R lower ribcage extending to the back in a linear pattern  Lymphatic: no submandibular or cervical LAD  Neurologic: AAOx3; CNII-XII grossly intact; no focal deficits  Psychiatric: affect and characteristics of appearance, verbalizations, behaviors are appropriate

## 2019-06-24 NOTE — ED ADULT TRIAGE NOTE - CHIEF COMPLAINT QUOTE
Patient, per wife, AAOx3, non-verbal presenting for weakness.  Per wife, patient has been lethargic with reduced PO intake.  Per patient's wife, patient fell 10 days ago, on Coumadin, presents with bruising to right ribs.  Patient's wife denies any N/V/D, fevers or any other complaints at this time.   via EMS, EKG in progress.

## 2019-06-24 NOTE — PATIENT PROFILE ADULT - STATED REASON FOR ADMISSION
Patient received his 2nd dose of Shingrex vaccine on Saturday, and since then he has become increasingly weak, lethargic and has decreased po intake.

## 2019-06-24 NOTE — H&P ADULT - NSHPSOCIALHISTORY_GEN_ALL_CORE
Remote history of tobacco use, quit 4 years ago  History of an alcohol use disorder  Lives at home with wife who is his primary caregiver

## 2019-06-24 NOTE — H&P ADULT - PROBLEM SELECTOR PLAN 2
Patient has history of advancing Alzheimer Dementia. Wife is current primary caregiver. She is concerned about his care and wishes for him to be evaluated for hospice care.  - c/w home Namzaric 28/10mg and Escitalopram 10mg daily  - DNR/DNI status  - Palliative consult in AM  - SW consult Patient has history of advancing Alzheimer Dementia. Wife is current primary caregiver. She is concerned about his care and wishes for him to be evaluated for hospice care. Home meds Namzaric 28/10mg, Escitalopram 10mg daily, Depakote DR 250mg BID.   - c/w home meds (therapeutic exchange for Namzaric is 28mg Namenda and 10mg  Aricept  - DNR/DNI status  - Palliative consult in AM  - SW consult

## 2019-06-24 NOTE — ED PROVIDER NOTE - OBJECTIVE STATEMENT
73 y/o M with PMHx of atrial fibrillation, DM, advanced Alzheimer's disease and PSHx of aortic valve replacement on Coumadin presents to the ED brought in by ambulance 75 y/o M with PMHx of atrial fibrillation, DM, advanced Alzheimer's disease and PSHx of aortic valve replacement on Coumadin presents to the ED brought in by ambulance for generalized weakness, lethargy and decreased PO intake beginning 2 nights ago. As per wife, patient slept most of the day yesterday. Patient urinates in diaper and has no change in urinary frequency which is baseline. Wife endorses patient was walking and tripped. Patient fell and injured R torso on the ground. Patient did not strike head or lose consciousness at that time. Patient was ambulatory following fall. Denies fever, chills, cough, vomiting, diarrhea, abdominal pain or any other acute complaints. 73 y/o M with PMHx of atrial fibrillation, DM, advanced Alzheimer's disease and PSHx of aortic valve replacement on Coumadin presents to the ED brought in by ambulance for generalized weakness, lethargy and decreased PO intake beginning 2 nights ago. As per wife, patient slept most of the day yesterday. Patient urinates in diaper and has no change in urinary frequency which is baseline. Wife endorses patient was walking and tripped 1 wk ago. Patient fell and injured R torso during fall. Patient did not strike head or lose consciousness at that time. Patient was ambulatory following fall. Denies fever, chills, cough, vomiting, diarrhea, abdominal pain or any other acute complaints.

## 2019-06-24 NOTE — ED PROVIDER NOTE - CLINICAL SUMMARY MEDICAL DECISION MAKING FREE TEXT BOX
Impression: lethargy, weakness, decreased po intake over the past 24-48 hrs. Afebrile. HDS. EKG showing nsr, with minimal st dep v5, v6, also seen on previous ekg. CT head neg for acute bleed/ infarct. CT a/p neg for acute injury. UA neg for infection. + mild leukocytosis to 11; renal function and electrolytes wnl. Pt seen by hospice at wife's request- pt does not meet requirements for hospice at this time. Case d/w Dr. Delaney. ? sx's 2/2 advanced dementia. Spoke w/ family who is agreeing to admission overnight for iv hydration.

## 2019-06-24 NOTE — H&P ADULT - PROBLEM SELECTOR PLAN 6
Pt has history of aortic valve replacement. On home coumadin.  - dose Coumadin by INR Pt has history of aortic valve replacement. On home coumadin, alternates between 2mg and 3mg daily. Per outpatient note, goal INR of 2.5-3.5.  - ordered for 3mg Coumadin tonight given INR 2.29  - dose Coumadin by INR

## 2019-06-24 NOTE — ED PROVIDER NOTE - PHYSICAL EXAMINATION
VITAL SIGNS: I have reviewed nursing notes and confirm.  CONSTITUTIONAL: Well-developed; well-nourished; in no acute distress.   SKIN:  warm and dry, no acute rash.   HEAD:  normocephalic, atraumatic.  EYES: PERRL, EOM intact; conjunctiva and sclera clear.  ENT: No nasal discharge; airway clear.   NECK: Supple; non tender.  CARD: S1, S2 normal; no murmurs, gallops, or rubs. Regular rate and rhythm.   RESP:  Clear to auscultation b/l, no wheezes, rales or rhonchi.  ABD: Normal bowel sounds; soft; non-distended; non-tender; no guarding/ rebound.  EXT: Normal ROM. No clubbing, cyanosis or edema. 2+ pulses to b/l ue/le.  NEURO: Alert, oriented, grossly unremarkable  PSYCH: Cooperative, mood and affect appropriate. VITAL SIGNS: I have reviewed nursing notes and confirm.  CONSTITUTIONAL: Well-developed; well-nourished; in no acute distress.   SKIN:  warm and dry, no acute rash. Old appearing ecchymosis to RUQ going toward the R torso. No crepitus.   HEAD:  normocephalic, atraumatic.  EYES: PERRL, EOM intact; conjunctiva and sclera clear.  ENT: No nasal discharge; airway clear.   NECK: Supple; non tender.  CARD: S1, S2 normal; no murmurs, gallops, or rubs. Regular rate and rhythm.   RESP:  Clear to auscultation b/l, no wheezes, rales or rhonchi.  ABD: Normal bowel sounds; soft; non-distended; non-tender; no guarding/ rebound. No flank tenderness.   EXT: Normal ROM. No clubbing, cyanosis or edema. 2+ pulses to b/l ue/le. Moving all extremities with equal strength.   NEURO: Awake; pleasantly demented. Unable to have conversation which is baseline. At baseline mental status per wife.   PSYCH: Cooperative, mood and affect appropriate. VITAL SIGNS: I have reviewed nursing notes and confirm.  CONSTITUTIONAL: Well-developed; well-nourished, elderly male; in no acute distress.   SKIN:  warm and dry, no acute rash. Old appearing ecchymosis to RUQ/ lateral torso. No crepitus.   HEAD:  normocephalic, atraumatic.  EYES: EOM intact; conjunctiva and sclera clear.  ENT: No nasal discharge; airway clear.   NECK: Supple; non tender.  CARD: S1, S2 normal; no murmurs, gallops, or rubs. Regular rate and rhythm.   RESP:  Clear to auscultation b/l, no wheezes, rales or rhonchi.  ABD: Normal bowel sounds; soft; non-distended; non-tender; no guarding/ rebound. No flank tenderness or ecchymosis.   EXT: Normal ROM. No clubbing, cyanosis or edema. 2+ pulses to b/l ue/le.   NEURO: Awake; pleasantly demented. Unable to answer questions. At baseline mental status per wife. Moving all extremities with equal strength.   PSYCH: Cooperative.

## 2019-06-24 NOTE — ED ADULT NURSE REASSESSMENT NOTE - NS ED NURSE REASSESS COMMENT FT1
pt in no acute distress. safety maintained. pending CT. family at bedside. will continue to monitor.

## 2019-06-25 DIAGNOSIS — Z71.89 OTHER SPECIFIED COUNSELING: ICD-10-CM

## 2019-06-25 DIAGNOSIS — Z74.09 OTHER REDUCED MOBILITY: ICD-10-CM

## 2019-06-25 DIAGNOSIS — Z95.2 PRESENCE OF PROSTHETIC HEART VALVE: Chronic | ICD-10-CM

## 2019-06-25 DIAGNOSIS — Z51.5 ENCOUNTER FOR PALLIATIVE CARE: ICD-10-CM

## 2019-06-25 DIAGNOSIS — Z66 DO NOT RESUSCITATE: ICD-10-CM

## 2019-06-25 DIAGNOSIS — I69.30 UNSPECIFIED SEQUELAE OF CEREBRAL INFARCTION: ICD-10-CM

## 2019-06-25 LAB
ANION GAP SERPL CALC-SCNC: 9 MMOL/L — SIGNIFICANT CHANGE UP (ref 5–17)
APTT BLD: 33.4 SEC — SIGNIFICANT CHANGE UP (ref 27.5–36.3)
BUN SERPL-MCNC: 16 MG/DL — SIGNIFICANT CHANGE UP (ref 7–23)
CALCIUM SERPL-MCNC: 8.6 MG/DL — SIGNIFICANT CHANGE UP (ref 8.4–10.5)
CHLORIDE SERPL-SCNC: 106 MMOL/L — SIGNIFICANT CHANGE UP (ref 96–108)
CO2 SERPL-SCNC: 25 MMOL/L — SIGNIFICANT CHANGE UP (ref 22–31)
CREAT SERPL-MCNC: 0.83 MG/DL — SIGNIFICANT CHANGE UP (ref 0.5–1.3)
GLUCOSE SERPL-MCNC: 119 MG/DL — HIGH (ref 70–99)
HCT VFR BLD CALC: 41.1 % — SIGNIFICANT CHANGE UP (ref 39–50)
HGB BLD-MCNC: 13.5 G/DL — SIGNIFICANT CHANGE UP (ref 13–17)
INR BLD: 2.15 — HIGH (ref 0.88–1.16)
MAGNESIUM SERPL-MCNC: 1.7 MG/DL — SIGNIFICANT CHANGE UP (ref 1.6–2.6)
MCHC RBC-ENTMCNC: 31.8 PG — SIGNIFICANT CHANGE UP (ref 27–34)
MCHC RBC-ENTMCNC: 32.8 GM/DL — SIGNIFICANT CHANGE UP (ref 32–36)
MCV RBC AUTO: 96.9 FL — SIGNIFICANT CHANGE UP (ref 80–100)
NRBC # BLD: 0 /100 WBCS — SIGNIFICANT CHANGE UP (ref 0–0)
PLATELET # BLD AUTO: 130 K/UL — LOW (ref 150–400)
POTASSIUM SERPL-MCNC: 3.8 MMOL/L — SIGNIFICANT CHANGE UP (ref 3.5–5.3)
POTASSIUM SERPL-SCNC: 3.8 MMOL/L — SIGNIFICANT CHANGE UP (ref 3.5–5.3)
PROTHROM AB SERPL-ACNC: 24.9 SEC — HIGH (ref 10–12.9)
RBC # BLD: 4.24 M/UL — SIGNIFICANT CHANGE UP (ref 4.2–5.8)
RBC # FLD: 12.5 % — SIGNIFICANT CHANGE UP (ref 10.3–14.5)
SODIUM SERPL-SCNC: 140 MMOL/L — SIGNIFICANT CHANGE UP (ref 135–145)
WBC # BLD: 8.86 K/UL — SIGNIFICANT CHANGE UP (ref 3.8–10.5)
WBC # FLD AUTO: 8.86 K/UL — SIGNIFICANT CHANGE UP (ref 3.8–10.5)

## 2019-06-25 PROCEDURE — 99233 SBSQ HOSP IP/OBS HIGH 50: CPT | Mod: GC

## 2019-06-25 PROCEDURE — 99223 1ST HOSP IP/OBS HIGH 75: CPT

## 2019-06-25 PROCEDURE — 93970 EXTREMITY STUDY: CPT | Mod: 26

## 2019-06-25 RX ORDER — WARFARIN SODIUM 2.5 MG/1
3 TABLET ORAL ONCE
Refills: 0 | Status: COMPLETED | OUTPATIENT
Start: 2019-06-25 | End: 2019-06-25

## 2019-06-25 RX ADMIN — ESCITALOPRAM OXALATE 10 MILLIGRAM(S): 10 TABLET, FILM COATED ORAL at 11:50

## 2019-06-25 RX ADMIN — Medication 3 MILLIGRAM(S): at 22:10

## 2019-06-25 RX ADMIN — MEMANTINE HYDROCHLORIDE 10 MILLIGRAM(S): 10 TABLET ORAL at 17:10

## 2019-06-25 RX ADMIN — DIVALPROEX SODIUM 250 MILLIGRAM(S): 500 TABLET, DELAYED RELEASE ORAL at 06:48

## 2019-06-25 RX ADMIN — DORZOLAMIDE HYDROCHLORIDE 1 DROP(S): 20 SOLUTION/ DROPS OPHTHALMIC at 06:48

## 2019-06-25 RX ADMIN — ATORVASTATIN CALCIUM 20 MILLIGRAM(S): 80 TABLET, FILM COATED ORAL at 22:10

## 2019-06-25 RX ADMIN — DORZOLAMIDE HYDROCHLORIDE 1 DROP(S): 20 SOLUTION/ DROPS OPHTHALMIC at 13:18

## 2019-06-25 RX ADMIN — TAMSULOSIN HYDROCHLORIDE 0.4 MILLIGRAM(S): 0.4 CAPSULE ORAL at 06:48

## 2019-06-25 RX ADMIN — DONEPEZIL HYDROCHLORIDE 10 MILLIGRAM(S): 10 TABLET, FILM COATED ORAL at 22:10

## 2019-06-25 RX ADMIN — WARFARIN SODIUM 3 MILLIGRAM(S): 2.5 TABLET ORAL at 22:10

## 2019-06-25 RX ADMIN — MEMANTINE HYDROCHLORIDE 10 MILLIGRAM(S): 10 TABLET ORAL at 06:48

## 2019-06-25 RX ADMIN — LATANOPROST 1 DROP(S): 0.05 SOLUTION/ DROPS OPHTHALMIC; TOPICAL at 22:11

## 2019-06-25 RX ADMIN — Medication 81 MILLIGRAM(S): at 22:10

## 2019-06-25 RX ADMIN — DORZOLAMIDE HYDROCHLORIDE 1 DROP(S): 20 SOLUTION/ DROPS OPHTHALMIC at 22:10

## 2019-06-25 RX ADMIN — DIVALPROEX SODIUM 250 MILLIGRAM(S): 500 TABLET, DELAYED RELEASE ORAL at 17:10

## 2019-06-25 RX ADMIN — TAMSULOSIN HYDROCHLORIDE 0.4 MILLIGRAM(S): 0.4 CAPSULE ORAL at 17:10

## 2019-06-25 RX ADMIN — SODIUM CHLORIDE 80 MILLILITER(S): 9 INJECTION INTRAMUSCULAR; INTRAVENOUS; SUBCUTANEOUS at 06:50

## 2019-06-25 NOTE — CONSULT NOTE ADULT - PROBLEM SELECTOR RECOMMENDATION 2
Reportedly unable to carry on ADLs as per the wife. Wife uncertain she will be able to continue to care for the patient at home.

## 2019-06-25 NOTE — PHYSICAL THERAPY INITIAL EVALUATION ADULT - IMPAIRMENTS FOUND, PT EVAL
aerobic capacity/endurance/cognitive impairment/poor safety awareness/posture/arousal, attention, and cognition/gait, locomotion, and balance

## 2019-06-25 NOTE — DIETITIAN INITIAL EVALUATION ADULT. - PROBLEM SELECTOR PLAN 6
Pt has history of aortic valve replacement. On home coumadin, alternates between 2mg and 3mg daily. Per outpatient note, goal INR of 2.5-3.5.  - ordered for 3mg Coumadin tonight given INR 2.29  - dose Coumadin by INR

## 2019-06-25 NOTE — PHYSICAL THERAPY INITIAL EVALUATION ADULT - PERTINENT HX OF CURRENT PROBLEM, REHAB EVAL
Patient is a 74yoM with advanced Alzheimers dementia, DM, Afib, AVR (on Coumadin) and BPH who presents from home to [facility] ED with increased lethargy and weakness.

## 2019-06-25 NOTE — PHYSICAL THERAPY INITIAL EVALUATION ADULT - CRITERIA FOR SKILLED THERAPEUTIC INTERVENTIONS
impairments found/anticipated discharge recommendation/functional limitations in following categories/risk reduction/prevention/rehab potential/therapy frequency

## 2019-06-25 NOTE — DIETITIAN INITIAL EVALUATION ADULT. - DIET TYPE
CCHO renal with no snack consistent carbohydrate (no snacks)/plus Glucerna shakes BID(440kcal and 20gmprotein

## 2019-06-25 NOTE — PHYSICAL THERAPY INITIAL EVALUATION ADULT - GENERAL OBSERVATIONS, REHAB EVAL
Pt present sleeping semi-supine in bed, +IV, in NAD, AOx1, and agreeable manner w/ limited converstaion. Awaiting Doppler to r/o DVT but cleared for PT session by MD Quispe; CECILE Dowling informed and heplocked IV at start of session,

## 2019-06-25 NOTE — DIETITIAN INITIAL EVALUATION ADULT. - PROBLEM SELECTOR PLAN 10
1) PCP Contacted on Admission: (Y/N) --> Name & Phone #: Dr. Asia Delaney [phone]  2) Date of Contact with PCP:  3) PCP Contacted at Discharge: (Y/N, N/A)  4) Summary of Handoff Given to PCP:   5) Post-Discharge Appointment Date and Location:

## 2019-06-25 NOTE — PHYSICAL THERAPY INITIAL EVALUATION ADULT - IMPAIRMENTS CONTRIBUTING TO GAIT DEVIATIONS, PT EVAL
impaired balance/impaired coordination/cognition/unsteadiness, dec aerobic capacity/endurance impaired balance/decreased strength/cognition/impaired coordination/unsteadiness, dec aerobic capacity/endurance

## 2019-06-25 NOTE — DIETITIAN INITIAL EVALUATION ADULT. - PROBLEM SELECTOR PLAN 2
Patient has history of advancing Alzheimer Dementia. Wife is current primary caregiver. She is concerned about his care and wishes for him to be evaluated for hospice care. Home meds Namzaric 28/10mg, Escitalopram 10mg daily, Depakote DR 250mg BID.   - c/w home meds (therapeutic exchange for Namzaric is 28mg Namenda and 10mg  Aricept  - DNR/DNI status  - Palliative consult in AM  - SW consult

## 2019-06-25 NOTE — DIETITIAN INITIAL EVALUATION ADULT. - ENERGY NEEDS
Ht:6ft 2inches,IBW:190lbs +/-10%,80% of IBW.Noted 3-4 +LE edema.BMI:20.1 Adjusted for age as per [facility] standards.Increased protein needs due to stage 2 blisters

## 2019-06-25 NOTE — CONSULT NOTE ADULT - ASSESSMENT
Patient is a 74yoM with advanced Alzheimers dementia, DM, Afib, AVR (on Coumadin) and BPH who presents from home to [facility] ED with increased lethargy and weakness.      Problem/Plan - 1:  ·  Problem: Weakness.  Plan: - TSH wnl.   -CTH unremarkable for hemorrhage or infarct. CTA/P with no acute pathology.   DDx: transient cytokine increase due to Shingrex vaccine   - Pt has had falls at home and has LE edema, will f/u dopplers to r/o additional cause of fall/weakness  - PT consulted  - Continue to monitor for changes in mental status.     Problem/Plan - 2:  ·  Problem: Alzheimer disease.  Plan: Patient has history of advancing Alzheimer dementia.   -Wife is current primary caregiver. She is concerned about his care and wishes for him to be evaluated for hospice care.  - Home meds Namzaric 28/10mg, Escitalopram 10mg daily, Depakote DR 250mg BID.   - c/w home meds (therapeutic exchange for Namzaric is 28mg Namenda and 10mg  Aricept  - DNR/DNI status  - Palliative consulted; recs appreciated   - SW consult.     Problem/Plan - 3:  ·  Problem: Diabetes.  Plan: Pt has known history of diabetes. Per wife, not currently taking any meds.  - f/u A1c  - MISS.     Problem/Plan - 4:  ·  Problem: Afib.  Plan: Patient has known history of paroxsymal AFib.  - holding acebutolol in setting of bradycardia  - c/w coumadin    *************Addendum*************  Spoke to pt's PCP Dr. Delaney who stated that he in unsure why Mr. Beasley has been taking this medication but he has been on it for the past 10 years without complication. States that he is not opposed to it being discontinued.    Problem/Plan - 5:  ·  Problem: BPH (benign prostatic hyperplasia).  Plan: Pt takes home meds Tamsulosin 0.4mg BID and Finasteride 5mg daily.   - c/w home meds.     Problem/Plan - 6:  Problem: Aortic valve replaced. Plan: Pt has history of aortic valve replacement. On home coumadin, alternates between 2mg and 3mg daily.   -Per outpatient note, goal INR of 2.5-3.5.  - c/w 3mg Coumadin tonight given INR 2.15 this AM  - will continue to dose Coumadin daily by INR level   - dose Coumadin by INR.    Problem/Plan - 7:  ·  Problem: Bradycardia.  Plan: -Patient noted to have bradycardia with HR in the 40s.    - Pt with sinus bradycardia on [employer] records in outpatient cardiologist's office on 2017 EKG.  - continue to hold home Acebutolol  - will contact Dr. Delaney to find out collateral on dose of acebutolol  - avoid AV blocking agents  *************Addendum*************  Spoke to pt's PCP Dr. Delaney who stated that he in unsure why Mr. Beasley has been taking this medication but he has been on it for the past 10 years without complication. States that he is not opposed to it being discontinued.    Problem/Plan - 8:  ·  Problem: Nutrition, metabolism, and development symptoms.  Plan: F: none  E: replete as needed  N: regular diet.     Problem/Plan - 9:  ·  Problem: Prophylactic measure.  Plan: DVT: none  GI: none  Dispo: RMF  DNR/DNI.

## 2019-06-25 NOTE — CONSULT NOTE ADULT - ASSESSMENT
74yoM with advanced Alzheimers dementia, DM, Afib, AVR (on Coumadin) and BPH who presents from home to [facility] ED with increased lethargy and weakness. Patient presents with his wife and at bedside.

## 2019-06-25 NOTE — PHYSICAL THERAPY INITIAL EVALUATION ADULT - IMPAIRMENTS CONTRIBUTING IMPAIRED BED MOBILITY, REHAB EVAL
cognition/impaired coordination/pt unable to state impairments/sensation decreased strength/cognition/impaired coordination/pt unable to state impairments/sensation

## 2019-06-25 NOTE — CONSULT NOTE ADULT - SUBJECTIVE AND OBJECTIVE BOX
Patient is a 74y old  Male who presents with a chief complaint of Weakness (2019 10:49)       HPI:  Patient is a 74yoM with advanced Alzheimers dementia, DM, Afib, AVR (on Coumadin) and BPH who presents from home to [facility] ED with increased lethargy and weakness. Patient presents with his wife and at bedside. History is per wife. Wife reports that on Saturday, pt received his second dose of Shingrex vaccine then on , he had increased lethargy and weakness. Wife states that the pt would not reason and was very sleepy. She describes him as being "semi-comatose." Pt's wife also reports that pt had reduced PO intake over the last day. Pt's wife states that since coming to the hospital, pt has been more awake and alert than the previous day. Pt's wife reports that at baseline he ambulates with a walker, follows simple commands and acknowledges people in conversation. Pt's wife is pt's primary caregiver. She is concerned that patient may need hospice care. She does not wish to have her  treated with antibiotics and states that he is DNR/DNI. Of note, pt's wife reports that she was walking with the pt last week and he tripped over a hose on the ground and fell onto his right side onto a short fence. Wife denies head trauma or loss of consciousness and states that the patient was able to get up and ambulate after the event. (2019 15:36)      PAST MEDICAL & SURGICAL HISTORY:  Melanoma: s/p excision  BPH (benign prostatic hyperplasia)  Afib  Alzheimer disease  Diabetes  Aortic valve replaced  H/O aortic valve replacement  Aortic valve replaced  H/O hernia repair      MEDICATIONS  (STANDING):  aspirin enteric coated 81 milliGRAM(s) Oral at bedtime  atorvastatin 20 milliGRAM(s) Oral at bedtime  diVALproex  milliGRAM(s) Oral two times a day  donepezil 10 milliGRAM(s) Oral at bedtime  dorzolamide 2% Ophthalmic Solution 1 Drop(s) Both EYES three times a day  escitalopram 10 milliGRAM(s) Oral daily  latanoprost 0.005% Ophthalmic Solution 1 Drop(s) Both EYES at bedtime  melatonin 3 milliGRAM(s) Oral at bedtime  memantine 10 milliGRAM(s) Oral two times a day  sodium chloride 0.9%. 1000 milliLiter(s) (80 mL/Hr) IV Continuous <Continuous>  tamsulosin 0.4 milliGRAM(s) Oral two times a day  warfarin 3 milliGRAM(s) Oral once    MEDICATIONS  (PRN):      Social History: lives with his wife (primary caregiver) in an elevator accessible apartment building, no home care services    Functional Level Prior to Admission: partial assistance with bathing, walks with a rollator    FAMILY HISTORY:  FH: colon cancer  FH: diabetes mellitus      CBC Full  -  ( 2019 06:09 )  WBC Count : 8.86 K/uL  RBC Count : 4.24 M/uL  Hemoglobin : 13.5 g/dL  Hematocrit : 41.1 %  Platelet Count - Automated : 130 K/uL  Mean Cell Volume : 96.9 fl  Mean Cell Hemoglobin : 31.8 pg  Mean Cell Hemoglobin Concentration : 32.8 gm/dL  Auto Neutrophil # : x  Auto Lymphocyte # : x  Auto Monocyte # : x  Auto Eosinophil # : x  Auto Basophil # : x  Auto Neutrophil % : x  Auto Lymphocyte % : x  Auto Monocyte % : x  Auto Eosinophil % : x  Auto Basophil % : x          140  |  106  |  16  ----------------------------<  119<H>  3.8   |  25  |  0.83    Ca    8.6      2019 06:09  Mg     1.7         TPro  6.8  /  Alb  3.7  /  TBili  0.8  /  DBili  x   /  AST  14  /  ALT  15  /  AlkPhos  83        Urinalysis Basic - ( 2019 11:01 )    Color: Yellow / Appearance: Clear / S.025 / pH: x  Gluc: x / Ketone: Trace mg/dL  / Bili: Negative / Urobili: 0.2 E.U./dL   Blood: x / Protein: NEGATIVE mg/dL / Nitrite: NEGATIVE   Leuk Esterase: NEGATIVE / RBC: < 5 /HPF / WBC < 5 /HPF   Sq Epi: x / Non Sq Epi: 0-5 /HPF / Bacteria: Present /HPF          Radiology:    < from: Xray Chest 1 View AP/PA (19 @ 11:26) >    EXAM:  XR CHEST AP OR PA 1V                          PROCEDURE DATE:  2019          INTERPRETATION:  Chest single frontal view exam    History: Confusion weakness unresponsive    Impression:    Lungs clear with no infiltrate or pleural effusion. Postoperative change.   No pneumothorax. No acute abnormality. Similar appearance to prior exam   2019.        < from: CT Head No Cont (19 @ 14:25) >    EXAM:  CT BRAIN                          PROCEDURE DATE:  2019          INTERPRETATION:  PROCEDURE: CT brain without intravenous contrast    INDICATION: Altered mental status    TECHNIQUE: Multiple axial images were obtained at 5 mm intervalsfrom the   skull base to the vertex. Sagittal and coronal reformatted images were   obtained from the axial data set. The images were reviewed in brain and   bone windows.    COMPARISON: CT's brain 2019 and 2018    FINDINGS: The CT examination demonstrates generalized volume loss. In   addition, there is disproportionate enlargement of the ventricles when   compared to cortical sulci which may be secondary to a component of   superimposed communicating hydrocephalus. Clinical correlation for normal   pressure hydrocephalus (NPH) disease is recommended. The ventricles are   stable in size.    There are wedge-shaped area of encephalomalacia changes within the   anterior and inferior right frontal and superior right temporal lobes as   well as within the left parietal lobe which may be secondary to prior   infarcts. There is punctate calcification within the left parietal cortex   which may be dystrophic. There is mild patchy hypodensity within the   periventricular white matter which is nonspecific and may represent the   sequela small vessel ischemic disease in this patient. There is no   intracranial hemorrhage or acute transcortical infarct.    There is a small retention cyst/polyp within the right maxillary sinus.   The rest of the visualized paranasal sinuses are within normal limits.   The mastoid air cells are well aerated.    IMPRESSION: No intracranial hemorrhage or acute transcortical infarct.   Stable exam.          < from: CT Abdomen and Pelvis w/ IV Cont (19 @ 11:23) >    EXAM:  CT ABDOMEN AND PELVIS IC                          PROCEDURE DATE:  2019          INTERPRETATION:  CLINICAL INDICATION: 74-year-old with fall, weakness,   right upper quadrant ecchymosis; advanced dementia; history of melanoma.        FINDINGS: CT of the abdomen and pelvis was performed with the   administration of intravenous contrast. Reconstructions were performed in   the sagittal and coronal planes. No prior studies are available for   comparison. Limited by patient motion.    Evaluation of the lower chest demonstrates normal heart size. Aortic   valve replacement. Bibasilar atelectasis. Mild infectious/inflammatory   bronchiolitis localized to the right lower lobe. Evaluation of the   abdomen demonstrates hepatic steatosis. Spleen, pancreas, bilateral   adrenal glands, gallbladder and bilateral kidneys are unremarkable aside   from bilateral renal cysts and a nonobstructing calculus in the   interpolar region of the right kidney measuring 6 mm. Evaluation of the   gastrointestinal tract is negative for bowel thickening or bowel   obstruction. Fecal loading of stool within the rectum. Normal appearance   of the appendix. Moderate fat-containing left inguinal hernia. Evaluation   of the pelvis demonstrates the prostate and seminal vesicles are   unremarkable. Bladder is collapsed and thick-walled. There is no free   fluid. No adenopathy. Very severe aortic and vascular calcification.   Osseous structures demonstrates degenerative change of the lower lumbar   spine median sternotomy. Negative for displaced fracture.          IMPRESSION:    No acute abdominal/pelvic pathology.          Vital Signs Last 24 Hrs  T(C): 36.7 (2019 13:36), Max: 36.7 (2019 23:09)  T(F): 98.1 (2019 13:36), Max: 98.1 (2019 13:36)  HR: 66 (2019 13:36) (50 - 66)  BP: 136/66 (2019 13:36) (124/68 - 136/74)  BP(mean): --  RR: 18 (2019 13:36) (16 - 19)  SpO2: 95% (2019 13:36) (92% - 95%)    REVIEW OF SYSTEMS:    CONSTITUTIONAL: fatigue  EYES: No eye pain, visual disturbances, or discharge  ENMT:  No difficulty hearing, tinnitus, vertigo; No sinus or throat pain  NECK: No pain or stiffness  BREASTS: No pain, masses, or nipple discharge  RESPIRATORY: No cough, wheezing, chills or hemoptysis; No shortness of breath  CARDIOVASCULAR: No chest pain, palpitations, dizziness, or leg swelling  GASTROINTESTINAL: No abdominal or epigastric pain. No nausea, vomiting, or hematemesis; No diarrhea or constipation. No melena or hematochezia.  GENITOURINARY: No dysuria, frequency, hematuria, or incontinence  NEUROLOGICAL: No headaches, memory loss, loss of strength, numbness, or tremors  SKIN: No itching, burning, rashes, or lesions   LYMPH NODES: No enlarged glands  ENDOCRINE: No heat or cold intolerance; No hair loss  MUSCULOSKELETAL: No joint pain or swelling; No muscle, back, or extremity pain  PSYCHIATRIC: No depression, anxiety, mood swings, or difficulty sleeping  HEME/LYMPH: No easy bruising, or bleeding gums  ALLERGY AND IMMUNOLOGIC: No hives or eczema  VASCULAR: no swelling, erythema      Physical Exam: WDWN 73 yo  gentleman lying in semi Alamo's position, c/o feeling weak    Head: normocephalic, atraumatic    Eyes: PERRLA, EOMI, no nystagmus, sclera anicteric    ENT: nasal discharge, uvula midline, no oropharyngeal erythema/exudate    Neck: supple, negative JVD, negative carotid bruits, no thyromegaly    Chest: CTA bilaterally, neg wheeze, rhonchi, rales, crackles, egophany, left rib cage bruise    Cardiovascular: regular rate and rhythm, neg murmurs/rubs/gallops    Abdomen: soft, non distended, non tender, negative rebound/guarding, normal bowel sounds, neg hepatosplenomegaly    Extremities: 1 + pitting ankle edema, negative calf tenderness to palpation, negative Danna's sign    :     Neurologic Exam:    Alert and oriented x 1 to person speech stuttering but fluent w/o dysarthria    Cranial Nerves:     II:                       pupils equal, round and reactive to light, visual fields intact   III/ IV/VI:             extraocular movements intact, neg nystagmus, neg ptosis  V:                       facial sensation intact, V1-3 normal  VII:                     face symmetric, no droop, normal eye closure and smile  VIII:                    hearing intact to finger rub bilaterally  IX/ X:                 soft palate rise symmetrical  XI:                      head turning, shoulder shrug normal  XII:                     tongue midline    Motor Exam:    Upper Extremities:     RIght:   no focal weakness               negative drift    Left :  no focal weakness               negative drift    Lower Extremities:                 Right:     no focal weakness                 Left:       no focal weakness                   Sensory:    intact to LT/PP in all UE/LE dermatomes    DTR:            = biceps/     triceps/     brachioradialis                      = patella/   medial hamstring/ankle                      neg clonus                      neg Babinski                        Gait:  not tested        PM&R Impression:    1) deconditioned  2) no focal weakness  3) recent trip and fall with left ribcage ecchymosis        Recommendations:    1) Physical therapy focusing on therapeutic exercises, bed mobility/transfer out of bed evaluation, progressive ambulation with assistive devices prn.    2) Anticipated Disposition Plan/Recs: subacute rehab placement

## 2019-06-25 NOTE — CONSULT NOTE ADULT - SUBJECTIVE AND OBJECTIVE BOX
KYARA DOHERTY          MRN-[id number]            (1945)    HPI:  Patient is a 74yoM with advanced Alzheimers dementia, DM, Afib, AVR (on Coumadin) and BPH who presents from home to [facility] ED with increased lethargy and weakness. Patient presents with his wife and at bedside. History is per wife. Wife reports that on Saturday, pt received his second dose of Shingrex vaccine then on , he had increased lethargy and weakness. Wife states that the pt would not reason and was very sleepy. She describes him as being "semi-comatose." Pt's wife also reports that pt had reduced PO intake over the last day. Pt's wife states that since coming to the hospital, pt has been more awake and alert than the previous day. Pt's wife reports that at baseline he ambulates with a walker, follows simple commands and acknowledges people in conversation. Pt's wife is pt's primary caregiver. She is concerned that patient may need hospice care. She does not wish to have her  treated with antibiotics and states that he is DNR/DNI. Of note, pt's wife reports that she was walking with the pt last week and he tripped over a hose on the ground and fell onto his right side onto a short fence. Wife denies head trauma or loss of consciousness and states that the patient was able to get up and ambulate after the event. (2019 15:36)    PAST MEDICAL & SURGICAL HISTORY:  Melanoma: s/p excision  BPH (benign prostatic hyperplasia)  Afib  Alzheimer disease  Diabetes  Aortic valve replaced  H/O hernia repair    FAMILY HISTORY:  FH: colon cancer  FH: diabetes mellitus  Reviewed and found non contributory in mother or father    SOCIAL HISTORY: . Retired pharmaceutical . Was living with wife Shilpa Doherty [phone] in an elevator apartment building in [facility]. Originally from [place]. [employer]. Has adult son Kenroy involved in his care.     ROS:    Unable to attain due to:  Expressive aphasia                    Dyspnea (Roosevelt 0-10): 0                       N/V (Y/N): No                              Secretions (Y/N) : No             Agitation(Y/N): No  Pain (Y/N): No      -Provocation/Palliation: N/A  -Quality/Quantity: N/A  -Radiating: N/A  -Severity: No pain  -Timing/Frequency: N/A  -Impact on ADLs: N/A    General:  Weakness  HEENT:    Denied  Neck:  Denied  CVS:  Denied  Resp:  Denied  GI:  Denied  :  Denied  Musc:  Falls  Neuro:  Denied  Psych:  Denied  Skin:  Denied  Lymph:  Denied    Allergies: No Known Allergies or Intolerances    Opiate Naive (Y/N): Yes  -iStop reviewed (Y/N): Yes. No Rx found on iStop review. (Ref#: [id number])    Medications:      MEDICATIONS  (STANDING):  aspirin enteric coated 81 milliGRAM(s) Oral at bedtime  atorvastatin 20 milliGRAM(s) Oral at bedtime  diVALproex  milliGRAM(s) Oral two times a day  donepezil 10 milliGRAM(s) Oral at bedtime  dorzolamide 2% Ophthalmic Solution 1 Drop(s) Both EYES three times a day  escitalopram 10 milliGRAM(s) Oral daily  latanoprost 0.005% Ophthalmic Solution 1 Drop(s) Both EYES at bedtime  melatonin 3 milliGRAM(s) Oral at bedtime  memantine 10 milliGRAM(s) Oral two times a day  sodium chloride 0.9%. 1000 milliLiter(s) (80 mL/Hr) IV Continuous <Continuous>  tamsulosin 0.4 milliGRAM(s) Oral two times a day  warfarin 3 milliGRAM(s) Oral once    Labs:    CBC:                        13.5   8.86  )------( 130      ( 2019 06:09 )             41.1     CMP:        140  |  106  |  16  ----------------------------<  119<H>  3.8   |  25  |  0.83    Ca    8.6      2019 06:09  Mg     1.7         TPro  6.8  /  Alb  3.7  /  TBili  0.8  /  DBili  x   /  AST  14  /  ALT  15  /  AlkPhos  83      PT/INR - ( 2019 06:09 )   PT: 24.9 sec;   INR: 2.15     PTT - ( 2019 06:09 )  PTT:33.4 sec    Urinalysis Basic - ( 2019 11:01 )  Color: Yellow / Appearance: Clear / S.025 / pH: x  Gluc: x / Ketone: Trace mg/dL  / Bili: Negative / Urobili: 0.2 E.U./dL   Blood: x / Protein: NEGATIVE mg/dL / Nitrite: NEGATIVE   Leuk Esterase: NEGATIVE / RBC: < 5 /HPF / WBC < 5 /HPF   Sq Epi: x / Non Sq Epi: 0-5 /HPF / Bacteria: Present /HPF    POCT Blood Glucose.: 120: RN Notified Readback mg/dL (19 @ 08:11)    Thyroid Stimulating Hormone, Serum: 0.557 uIU/mL (19 @ 09:29)    Imaging:  Reviewed    EXAM:  XR CHEST AP OR PA 1V                        PROCEDURE DATE:  2019    INTERPRETATION:  Chest single frontal view exam  History: Confusion weakness unresponsive  Impression:  Lungs clear with no infiltrate or pleural effusion. Postoperative change.   No pneumothorax. No acute abnormality. Similar appearance to prior exam   2019.    EXAM:  CT BRAIN                        PROCEDURE DATE:  2019    INTERPRETATION:  PROCEDURE: CT brain without intravenous contrast  INDICATION: Altered mental status  TECHNIQUE: Multiple axial images were obtained at 5 mm intervalsfrom the   skull base to the vertex. Sagittal and coronal reformatted images were   obtained from the axial data set. The images were reviewed in brain and   bone windows.  COMPARISON: CT's brain 2019 and 2018  FINDINGS: The CT examination demonstrates generalized volume loss. In   addition, there is disproportionate enlargement of the ventricles when   compared to cortical sulci which may be secondary to a component of   superimposed communicating hydrocephalus. Clinical correlation for normal   pressure hydrocephalus (NPH) disease is recommended. The ventricles are   stable in size.  There are wedge-shaped area of encephalomalacia changes within the   anterior and inferior right frontal and superior right temporal lobes as   well as within the left parietal lobe which may be secondary to prior   infarcts. There is punctate calcification within the left parietal cortex   which may be dystrophic. There is mild patchy hypodensity within the   periventricular white matter which is nonspecific and may represent the   sequela small vessel ischemic disease in this patient. There is no   intracranial hemorrhage or acute transcortical infarct.  There is a small retention cyst/polyp within the right maxillary sinus.   The rest of the visualized paranasal sinuses are within normal limits.   The mastoid air cells are well aerated.  IMPRESSION: No intracranial hemorrhage or acute transcortical infarct.   Stable exam.    EXAM:  CT ABDOMEN AND PELVIS IC                        PROCEDURE DATE:  2019    INTERPRETATION:  CLINICAL INDICATION: 74-year-old with fall, weakness,   right upper quadrant ecchymosis; advanced dementia; history of melanoma.  FINDINGS: CT of the abdomen and pelvis was performed with the   administration of intravenous contrast. Reconstructions were performed in   the sagittal and coronal planes. No prior studies are available for   comparison. Limited by patient motion.  Evaluation of the lower chest demonstrates normal heart size. Aortic   valve replacement. Bibasilar atelectasis. Mild infectious/inflammatory   bronchiolitis localized to the right lower lobe. Evaluation of the   abdomen demonstrates hepatic steatosis. Spleen, pancreas, bilateral   adrenal glands, gallbladder and bilateral kidneys are unremarkable aside   from bilateral renal cysts and a nonobstructing calculus in the   interpolar region of the right kidney measuring 6 mm. Evaluation of the   gastrointestinal tract is negative for bowel thickening or bowel   obstruction. Fecal loading of stool within the rectum. Normal appearance   of the appendix. Moderate fat-containing left inguinal hernia. Evaluation   of the pelvis demonstrates the prostate and seminal vesicles are   unremarkable. Bladder is collapsed and thick-walled. There is no free   fluid. No adenopathy. Very severe aortic and vascular calcification.   Osseous structures demonstrates degenerative change of the lower lumbar   spine median sternotomy. Negative for displaced fracture.  IMPRESSION:  No acute abdominal/pelvic pathology.    12 Lead ECG 19 @ 09:02  Ventricular Rate 70 BPM  Atrial Rate 70 BPM  P-R Interval 176 ms  QRS Duration 88 ms  Q-T Interval 432 ms  QTC Calculation(Bezet) 466 ms  P Axis 76 degrees  R Axis 29 degrees  T Axis 93 degrees  Diagnosis Line Normal sinus rhythm  Nonspecific ST and T wave abnormality  Confirmed by Mike Gill ([id number]) on 2019 12:30:47 PM    PEx:  T(C): 36.3 (19 @ 04:57), Max: 36.7 (19 @ 23:09)  HR: 50 (19 @ 04:57) (46 - 60)  BP: 124/68 (19 @ 04:57) (124/68 - 141/72)  RR: 19 (19 @ 04:57) (16 - 19)  SpO2: 95% (19 @ 04:57) (92% - 95%)  Wt(kg):  70.9    General: Awake alert oriented x1 well nourished man in NAD sitting in bedside chair engaging with family.   HEENT: NCAT PERRL EOMI MOM   Neck: Supple no masses  CVS: Bradycardic S1S2 No M/G/R  Resp: Unlabored CTAB  GI:  Soft NT ND BS+  : Voiding freely   Musc: Good strength all extremities No C/C Ankle edema R>L    Neuro: Expressive aphasia Stuttering  Psych: Calm Pleasant    Skin: Non jaundiced Old surgical midchest scar  Lymph: Edema+  Preadmit Karnofsky: 60%           Current Karnofsky:   50%  Cachexia (Y/N): No  BMI: 20.1    Advanced Directives:     DNR/DNI    Decision maker: The patient does not demonstrate capacity for complex medical decision making given deficits.   Legal surrogate: No documented HCP in the paper chart    GOALS OF CARE DISCUSSION       Palliative care info/counseling provided	           Family meeting Done at bedside       Documentation of GOC: DNR DNI  Family wants to pursue ALBERTO and transition to LTC in [place].	          REFERRALS	        Unit SW/Case Mgmt        - [employer]       Music Therapy       PT/OT KYARA DOHERTY          MRN-[id number]            (1945)    HPI:  Patient is a 74yoM with advanced Alzheimers dementia, DM, Afib, AVR (on Coumadin) and BPH who presents from home to [facility] ED with increased lethargy and weakness. Patient presents with his wife and at bedside. History is per wife. Wife reports that on Saturday, pt received his second dose of Shingrex vaccine then on , he had increased lethargy and weakness. Wife states that the pt would not reason and was very sleepy. She describes him as being "semi-comatose." Pt's wife also reports that pt had reduced PO intake over the last day. Pt's wife states that since coming to the hospital, pt has been more awake and alert than the previous day. Pt's wife reports that at baseline he ambulates with a walker, follows simple commands and acknowledges people in conversation. Pt's wife is pt's primary caregiver. She is concerned that patient may need hospice care. She does not wish to have her  treated with antibiotics and states that he is DNR/DNI. Of note, pt's wife reports that she was walking with the pt last week and he tripped over a hose on the ground and fell onto his right side onto a short fence. Wife denies head trauma or loss of consciousness and states that the patient was able to get up and ambulate after the event. 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 Originally from [place]. [employer]. Has adult son Kenroy involved in his care.     ROS:    Unable to attain due to:  Expressive aphasia                    Dyspnea (Roosevelt 0-10): 0                       N/V (Y/N): No                              Secretions (Y/N) : No             Agitation(Y/N): No  Pain (Y/N): No      -Provocation/Palliation: N/A  -Quality/Quantity: N/A  -Radiating: N/A  -Severity: No pain  -Timing/Frequency: N/A  -Impact on ADLs: N/A    General:  Weakness  HEENT:    Denied  Neck:  Denied  CVS:  Denied  Resp:  Denied  GI:  Denied  :  Denied  Musc:  Falls  Neuro:  Denied  Psych:  Denied  Skin:  Denied  Lymph:  Denied    Allergies: No Known Allergies or Intolerances    Opiate Naive (Y/N): Yes  -iStop reviewed (Y/N): Yes. No Rx found on iStop review. (Ref#: [id number])    Medications:      MEDICATIONS  (STANDING):  aspirin enteric coated 81 milliGRAM(s) Oral at bedtime  atorvastatin 20 milliGRAM(s) Oral at bedtime  diVALproex  milliGRAM(s) Oral two times a day  donepezil 10 milliGRAM(s) Oral at bedtime  dorzolamide 2% Ophthalmic Solution 1 Drop(s) Both EYES three times a day  escitalopram 10 milliGRAM(s) Oral daily  latanoprost 0.005% Ophthalmic Solution 1 Drop(s) Both EYES at bedtime  melatonin 3 milliGRAM(s) Oral at bedtime  memantine 10 milliGRAM(s) Oral two times a day  sodium chloride 0.9%. 1000 milliLiter(s) (80 mL/Hr) IV Continuous <Continuous>  tamsulosin 0.4 milliGRAM(s) Oral two times a day  warfarin 3 milliGRAM(s) Oral once    Labs:    CBC:                        13.5   8.86  )------( 130      ( 2019 06:09 )             41.1     CMP:        140  |  106  |  16  ----------------------------<  119<H>  3.8   |  25  |  0.83    Ca    8.6      2019 06:09  Mg     1.7     25    TPro  6.8  /  Alb  3.7  /  TBili  0.8  /  DBili  x   /  AST  14  /  ALT  15  /  AlkPhos  83  24    PT/INR - ( 2019 06:09 )   PT: 24.9 sec;   INR: 2.15     PTT - ( 2019 06:09 )  PTT:33.4 sec    Urinalysis Basic - ( 2019 11:01 )  Color: Yellow / Appearance: Clear / S.025 / pH: x  Gluc: x / Ketone: Trace mg/dL  / Bili: Negative / Urobili: 0.2 E.U./dL   Blood: x / Protein: NEGATIVE mg/dL / Nitrite: NEGATIVE   Leuk Esterase: NEGATIVE / RBC: < 5 /HPF / WBC < 5 /HPF   Sq Epi: x / Non Sq Epi: 0-5 /HPF / Bacteria: Present /HPF    POCT Blood Glucose.: 120: RN Notified Readback mg/dL (19 @ 08:11)    Thyroid Stimulating Hormone, Serum: 0.557 uIU/mL (19 @ 09:29)    Imaging:  Reviewed    EXAM:  XR CHEST AP OR PA 1V                        PROCEDURE DATE:  2019    INTERPRETATION:  Chest single frontal view exam  History: Confusion weakness unresponsive  Impression:  Lungs clear with no infiltrate or pleural effusion. Postoperative change.   No pneumothorax. No acute abnormality. Similar appearance to prior exam   2019.    EXAM:  CT BRAIN                        PROCEDURE DATE:  2019    INTERPRETATION:  PROCEDURE: CT brain without intravenous contrast  INDICATION: Altered mental status  TECHNIQUE: Multiple axial images were obtained at 5 mm intervalsfrom the   skull base to the vertex. Sagittal and coronal reformatted images were   obtained from the axial data set. The images were reviewed in brain and   bone windows.  COMPARISON: CT's brain 2019 and 2018  FINDINGS: The CT examination demonstrates generalized volume loss. In   addition, there is disproportionate enlargement of the ventricles when   compared to cortical sulci which may be secondary to a component of   superimposed communicating hydrocephalus. Clinical correlation for normal   pressure hydrocephalus (NPH) disease is recommended. The ventricles are   stable in size.  There are wedge-shaped area of encephalomalacia changes within the   anterior and inferior right frontal and superior right temporal lobes as   well as within the left parietal lobe which may be secondary to prior   infarcts. There is punctate calcification within the left parietal cortex   which may be dystrophic. There is mild patchy hypodensity within the   periventricular white matter which is nonspecific and may represent the   sequela small vessel ischemic disease in this patient. There is no   intracranial hemorrhage or acute transcortical infarct.  There is a small retention cyst/polyp within the right maxillary sinus.   The rest of the visualized paranasal sinuses are within normal limits.   The mastoid air cells are well aerated.  IMPRESSION: No intracranial hemorrhage or acute transcortical infarct.   Stable exam.    EXAM:  CT ABDOMEN AND PELVIS IC                        PROCEDURE DATE:  2019    INTERPRETATION:  CLINICAL INDICATION: 74-year-old with fall, weakness,   right upper quadrant ecchymosis; advanced dementia; history of melanoma.  FINDINGS: CT of the abdomen and pelvis was performed with the   administration of intravenous contrast. Reconstructions were performed in   the sagittal and coronal planes. No prior studies are available for   comparison. Limited by patient motion.  Evaluation of the lower chest demonstrates normal heart size. Aortic   valve replacement. Bibasilar atelectasis. Mild infectious/inflammatory   bronchiolitis localized to the right lower lobe. Evaluation of the   abdomen demonstrates hepatic steatosis. Spleen, pancreas, bilateral   adrenal glands, gallbladder and bilateral kidneys are unremarkable aside   from bilateral renal cysts and a nonobstructing calculus in the   interpolar region of the right kidney measuring 6 mm. Evaluation of the   gastrointestinal tract is negative for bowel thickening or bowel   obstruction. Fecal loading of stool within the rectum. Normal appearance   of the appendix. Moderate fat-containing left inguinal hernia. Evaluation   of the pelvis demonstrates the prostate and seminal vesicles are   unremarkable. Bladder is collapsed and thick-walled. There is no free   fluid. No adenopathy. Very severe aortic and vascular calcification.   Osseous structures demonstrates degenerative change of the lower lumbar   spine median sternotomy. Negative for displaced fracture.  IMPRESSION:  No acute abdominal/pelvic pathology.    12 Lead ECG 19 @ 09:02  Ventricular Rate 70 BPM  Atrial Rate 70 BPM  P-R Interval 176 ms  QRS Duration 88 ms  Q-T Interval 432 ms  QTC Calculation(Bezet) 466 ms  P Axis 76 degrees  R Axis 29 degrees  T Axis 93 degrees  Diagnosis Line Normal sinus rhythm  Nonspecific ST and T wave abnormality  Confirmed by Mike Gill ([id number]) on 2019 12:30:47 PM    PEx:  T(C): 36.3 (19 @ 04:57), Max: 36.7 (19 @ 23:09)  HR: 50 (19 @ 04:57) (46 - 60)  BP: 124/68 (19 @ 04:57) (124/68 - 141/72)  RR: 19 (19 @ 04:57) (16 - 19)  SpO2: 95% (19 @ 04:57) (92% - 95%)  Wt(kg):  70.9    General: Awake alert oriented x1 well nourished man in NAD sitting in bedside chair engaging with family.   HEENT: NCAT PERRL EOMI MOM   Neck: Supple no masses  CVS: Bradycardic S1S2 No M/G/R  Resp: Unlabored CTAB  GI:  Soft NT ND BS+  : Voiding freely   Musc: Good strength all extremities No C/C Ankle edema R>L    Neuro: Expressive aphasia Stuttering  Psych: Calm Pleasant    Skin: Non jaundiced Old surgical midchest scar  ecchymosis  Lymph: Edema+  Preadmit Karnofsky: 60%           Current Karnofsky:   50%  Cachexia (Y/N): No  BMI: 20.1    Advanced Directives:     DNR/DNI    Decision maker: The patient does not demonstrate capacity for complex medical decision making given deficits.   Legal surrogate: No documented HCP in the paper chart    GOALS OF CARE DISCUSSION       Palliative care info/counseling provided	           Family meeting Done at bedside       Documentation of GOC: DNR DNI  Family wants to pursue ALBERTO and transition to LTC in [place].	          REFERRALS	        Unit SW/Case Mgmt        - [employer]       Music Therapy       PT/OT

## 2019-06-25 NOTE — CONSULT NOTE ADULT - SUBJECTIVE AND OBJECTIVE BOX
CHIEF COMPLAINT:  lethargy  HISTORY OF PRESENT ILLNESS:  The patient had a mechanical aortic valve placed in 1988. 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 He had a coronary angiogram in 2001 which showed normal coronaries. He has diabetes and hypertension. In April 2018 he had an episode of dragging one foot. This has resolved and not recurred. He has been treated with aspirin and warfarin. The patient has had dementia for years. His mental capacity has been worsening. He is unable to carry on a conversation.The echocardiogram in April of 2019 shows a mechanical aortic prosthesis with normal function. There is mild left ventricular hypertrophy. Ejection fraction is 55%. A carotid Doppler from May of 2018 showed mild plaque.  The patient recently received the shingles vaccine. He has been sleeping most of the day and does not answer simple questions.    Chart, PMH, medication and allergies reviewed  PAST MEDICAL & SURGICAL HISTORY:  Melanoma: s/p excision  BPH (benign prostatic hyperplasia)  Alzheimer disease  Diabetes  Aortic valve replaced  Aortic valve replaced  H/O hernia repair      [ x ] Diabetes   [ x ] Hypertension  [  ] Hyperlipidemia  [  ] CAD  [  ] PCI  [  ] CABG    PREVIOUS DIAGNOSTIC TESTING:    [ x ] Echocardiogram:  [ x ]  Catheterization:  [  ] Stress Test:  	    MEDICATIONS:  MEDICATIONS  (STANDING):  aspirin enteric coated 81 milliGRAM(s) Oral at bedtime  atorvastatin 20 milliGRAM(s) Oral at bedtime  diVALproex  milliGRAM(s) Oral two times a day  donepezil 10 milliGRAM(s) Oral at bedtime  dorzolamide 2% Ophthalmic Solution 1 Drop(s) Both EYES three times a day  escitalopram 10 milliGRAM(s) Oral daily  latanoprost 0.005% Ophthalmic Solution 1 Drop(s) Both EYES at bedtime  melatonin 3 milliGRAM(s) Oral at bedtime  memantine 10 milliGRAM(s) Oral two times a day  sodium chloride 0.9%. 1000 milliLiter(s) (80 mL/Hr) IV Continuous <Continuous>  tamsulosin 0.4 milliGRAM(s) Oral two times a day      FAMILY HISTORY:  FH: colon cancer  FH: diabetes mellitus      SOCIAL HISTORY:    [  ] Never smoker  [  ] Non-smoker  [  ] Smoker  [  ] Social alcohol use  [ x ] Former smoker    Allergies    No Known Allergies    Intolerances    	    REVIEW OF SYSTEMS:  cannot evaluate	    PHYSICAL EXAM:  T(C): 36.3 (06-25-19 @ 04:57), Max: 36.7 (06-24-19 @ 23:09)  HR: 50 (06-25-19 @ 04:57) (46 - 60)  BP: 124/68 (06-25-19 @ 04:57) (124/68 - 141/72)  RR: 19 (06-25-19 @ 04:57) (16 - 19)  SpO2: 95% (06-25-19 @ 04:57) (92% - 96%)  Wt(kg): --  I&O's Summary    24 Jun 2019 07:01  -  25 Jun 2019 07:00  --------------------------------------------------------  IN: 640 mL / OUT: 301 mL / NET: 339 mL        Appearance: Normal	  HEENT:   Normal oral mucosa, PERRL, EOMI	  Lymphatic: No lymphadenopathy  Cardiovascular: metallic S1 S2, No JVD, No murmur, 1-2+ edema  Pulmonary: Lungs clear to auscultation	  Psychiatry: A & O x 3, Mood & affect appropriate  Gastrointestinal:  Soft, Non-tender, + BS	  Skin: No rashes, No ecchymoses, No cyanosis	  Neurologic: Non-focal, alert, responds yes to all questions  Extremities: Normal range of motion, No clubbing or cyanosis  	 	  CARDIAC MARKERS:                                13.5   8.86  )-----------( 130      ( 25 Jun 2019 06:09 )             41.1     06-25    140  |  106  |  16  ----------------------------<  119<H>  3.8   |  25  |  0.83    Ca    8.6      25 Jun 2019 06:09  Mg     1.7     06-25    TPro  6.8  /  Alb  3.7  /  TBili  0.8  /  DBili  x   /  AST  14  /  ALT  15  /  AlkPhos  83  06-24    proBNP:  Lipid Profile:  HgA1c:  TSH: Thyroid Stimulating Hormone, Serum: 0.557 uIU/mL (06-24 @ 09:29)   PT/INR - ( 25 Jun 2019 06:09 )   PT: 24.9 sec;   INR: 2.15          PTT - ( 25 Jun 2019 06:09 )  PTT:33.4 sec  TELEMETRY: 	    ECG:  Sinus iveth ST T wave abn 	QTC nl  RADIOLOGY:	    ASSESSMENT/PLAN: 	  Lethargy/ h/o CVA and TIA-the patient has no fever or leukocytosis. He has a prosthetic aortic valve and history of endocarditis. Consider blood culture and echocardiogram. However the patient's wife refuses antibiotics. Endocarditis is unlikely. The CAT scan does not show a new CVA.    Aortic valve replacement-normal function on recent echo. No clinical evidence for congestive heart failure. Continue warfarin and aspirin.    Arrhythmia-the patient has been on acebutolol for years only once a day. The indication is unclear. It is being held for bradycardia which has been well-tolerated in the past.    Prostatism-the patient has been on finasteride in the past.

## 2019-06-25 NOTE — PHYSICAL THERAPY INITIAL EVALUATION ADULT - MANUAL MUSCLE TESTING RESULTS, REHAB EVAL
functional mobility tasks (unable to follow MMT commands) EUs and LEs =/> 3+/5,  strength 5/5 bilaterally/grossly assessed due to

## 2019-06-25 NOTE — DIETITIAN INITIAL EVALUATION ADULT. - PHYSICAL APPEARANCE
debilitated/other (specify)/80% of IBW but with 3-4+LE edema reported.Stage 2PU blister reported./Advanced dementia. Unable to quantify UBW.Wife not at bedside stage 2 blisters reported. Suspected some malnutrition due to skin integrity with 3-4+LE edema.

## 2019-06-25 NOTE — PHYSICAL THERAPY INITIAL EVALUATION ADULT - PHYSICAL ASSIST/NONPHYSICAL ASSIST: GAIT, REHAB EVAL
verbal cues/Krishna needed to manage RW/2 person assist/nonverbal cues (demo/gestures) 2 person assist/verbal cues/nonverbal cues (demo/gestures)/assist needed to manage RW

## 2019-06-25 NOTE — CONSULT NOTE ADULT - PROBLEM SELECTOR RECOMMENDATION 7
Support provided to patient and family. Patient to have access to supportive services during rest of hospital stay as the patient/family deemed necessary ie. Chaplaincy, Massage therapy, Music therapy, Patient and family supportive services, Palliative SW, etc.    As discussed during the palliative IDT meeting and as identified during the patients PSSA screening the patient would benefit from chaplaincy, massage, and music therapy.

## 2019-06-25 NOTE — PHYSICAL THERAPY INITIAL EVALUATION ADULT - ADDITIONAL COMMENTS
Social Hx obtained via wife and son at bedside. Pt's wife reports pt ambulates in the home without AD. And recently started amb outside with a walker s/p recent fall, and states pt shuffles feet when fatigued. Pt follows simple commands and acknowledges people in conversation, but is hard of hearing. Pt's wife is pt's primary caregiver and supervises ADLs on "good days" and provides moderate assist on "bad days". Pt's wife states she has had a hard time with transfers on occasion and is considering moving pt a long-term assisted living home in [place] where her mother lives.

## 2019-06-25 NOTE — PHYSICAL THERAPY INITIAL EVALUATION ADULT - FOLLOWS COMMANDS/ANSWERS QUESTIONS, REHAB EVAL
req increased time and increased encouragement to follow 1-step commands/75% of the time/able to follow single-step instructions

## 2019-06-25 NOTE — DIETITIAN INITIAL EVALUATION ADULT. - OTHER INFO
73 y/o male with advanced Alzheimers admitted with weakness and lethargy .Noted history of Alzheimers ,DM,Afib,BPH. No N/V/D or pain reported. Skin with reported stage 2 blisters on sacrum .Patient ate 100% of breakfast tray without assistance.

## 2019-06-25 NOTE — PROGRESS NOTE ADULT - ATTENDING COMMENTS
Patient was seen and examined with the resident team today.  I agree with Dr. Temple's assessment and plan with the following exceptions/additions:     Briefly, this is a 73yo gentleman with a PMh of rapidly progressive, advanced Azheimer's dementia, NIDDM2, Afib, AVR (on Coumadin) and BPH who p/w increased lethargy and weakness following his 2nd Shingrix dose, as well as ongoing cognitive decline with increased gait instability.  Clinically he improved w/no medical intervention; thus, suspect his initial presentation likely related to vaccine side effect.  Of note, family volunteered interest in hospice in the ED, as well as limited work-up for Mr. Beasley.  Vitals and exam notable for sinus bradycardia and chronic 2+ PEDRO LUIS pitting edema.    -- t/b with SW, RE: home hospice  -- PT eval   -- f/u on family's decision to sign MOLST form  -- discuss with Dr. Delaney if he'd to restart BB at a lower dose  -- f/u LE dopplers   -- DVT PPx - on therapeutic Coumadin  -- Dispo - likely home with services today v tomorrow    Iesha Kate  [phone] Patient was seen and examined with the resident team today.  I agree with Dr. Temple's assessment and plan with the following exceptions/additions:     Briefly, this is a 73yo gentleman with a PMh of rapidly progressive, advanced Azheimer's dementia, NIDDM2, Afib, AVR (on Coumadin) and BPH who p/w increased lethargy and weakness following his 2nd Shingrix dose, as well as ongoing cognitive decline with increased gait instability.  Clinically he improved w/no medical intervention; thus, suspect his initial presentation likely related to vaccine side effect.  Of note, family volunteered interest in hospice in the ED, as well as limited work-up for Mr. Beasley.  Vitals and exam notable for sinus bradycardia and chronic 2+ PEDRO LUIS pitting edema.    -- t/b with SW, RE: home hospice  -- PT eval   -- f/u on family's decision to sign MOLST form  -- discuss with Dr. Delaney if he'd to restart BB at a lower dose  -- f/u LE dopplers   -- DVT PPx - on therapeutic Coumadin  -- Dispo - [facility] w/transition to long term care    Iesha Kate  [phone]

## 2019-06-25 NOTE — CONSULT NOTE ADULT - PROBLEM SELECTOR RECOMMENDATION 5
The patient's wife and son at bedside state they would like the patient to have rehab and transition to long term care at [facility] where the patient's wife mother currently resides.

## 2019-06-25 NOTE — PHYSICAL THERAPY INITIAL EVALUATION ADULT - GAIT DEVIATIONS NOTED, PT EVAL
shuffling gait with limited toe clearance, walking with RW too far away and not centered during turns/decreased step length/decreased stride length decreased stride length/decreased step length/shuffling gait with limited toe clearance, walking with RW too far away and RW not centered during turns

## 2019-06-25 NOTE — PHYSICAL THERAPY INITIAL EVALUATION ADULT - IMPAIRED TRANSFERS: SIT/STAND, REHAB EVAL
impaired balance/impaired coordination/cognition/unsteadiness/shakiness in legs, dec aerobic capacity/endurance decreased strength/impaired balance/cognition/impaired coordination/unsteadiness/shakiness in legs, dec aerobic capacity/endurance

## 2019-06-25 NOTE — CONSULT NOTE ADULT - PROBLEM SELECTOR RECOMMENDATION 6
Most deficits related to old stroke. FAST scale: 6B. Albumin 3.7  Wt: 70.9  BMI: 20.1  The patient DOES NOT qualify for hospice benefits at this time.

## 2019-06-25 NOTE — CONSULT NOTE ADULT - PROBLEM SELECTOR RECOMMENDATION 9
Much improved since admission. Patient was able to work with PT but required 2 person assist. Recommendations from PT of [facility] which family agrees to pursue. They request [facility] in [place] where they have planned for the patient to transition to long term care.  Recommend primary SW/CM referral to [facility] in [place].

## 2019-06-25 NOTE — PROGRESS NOTE ADULT - PROBLEM SELECTOR PLAN 7
-Patient noted to have bradycardia with HR in the 40s.    - Pt with sinus bradycardia on [employer] records in outpatient cardiologist's office on 2017 EKG.  - continue to hold home Acebutolol  - will contact Dr. Delaney to find out collateral on dose of acebutolol  - avoid AV blocking agents -Patient noted to have bradycardia with HR in the 40s.    - Pt with sinus bradycardia on [employer] records in outpatient cardiologist's office on 2017 EKG.  - continue to hold home Acebutolol  - will contact Dr. Delaney to find out collateral on dose of acebutolol  - avoid AV blocking agents  *************Addendum*************  Spoke to pt's PCP Dr. Delaney who stated that he in unsure why Mr. Beasley has been taking this medication but he has been on it for the past 10 years without complication. States that he is not opposed to it being discontinued.

## 2019-06-25 NOTE — CONSULT NOTE ADULT - PROBLEM SELECTOR RECOMMENDATION 3
Residual expressive aphasia. Patient able to understand conversations had and is able to nod yes/no to certain questions and answers with single word answers appropriately. Once he attempts to formulate a full sentence he starts to stutter.

## 2019-06-25 NOTE — PROGRESS NOTE ADULT - SUBJECTIVE AND OBJECTIVE BOX
INCOMPLETE NOTE  O/N Events:  Subjective/ROS: Denies HA, CP, SOB, n/v, changes in bowel/urinary habits.  12pt ROS otherwise negative.    VITALS  Vital Signs Last 24 Hrs  T(C): 36.3 (2019 04:57), Max: 36.7 (2019 23:09)  T(F): 97.4 (2019 04:57), Max: 98 (2019 23:09)  HR: 50 (2019 04:57) (46 - 60)  BP: 124/68 (2019 04:57) (124/68 - 141/72)  BP(mean): --  RR: 19 (2019 04:57) (16 - 19)  SpO2: 95% (2019 04:57) (92% - 96%)    CAPILLARY BLOOD GLUCOSE      POCT Blood Glucose.: 158 mg/dL (2019 22:36)      PHYSICAL EXAM  General: A&Ox3; NAD  Head: NC/AT; MMM; PERRL; EOMI;  Neck: Supple; no JVD  Respiratory: CTA B/L; no wheezes/crackles   Cardiovascular: Regular rhythm/rate; S1/S2   Gastrointestinal: Soft; NTND; normoactive BS  Extremities: WWP; no edema/cyanosis  Neurological:  CNII-XII grossly intact; no obvious focal deficits    MEDICATIONS  (STANDING):  aspirin enteric coated 81 milliGRAM(s) Oral at bedtime  atorvastatin 20 milliGRAM(s) Oral at bedtime  diVALproex  milliGRAM(s) Oral two times a day  donepezil 10 milliGRAM(s) Oral at bedtime  dorzolamide 2% Ophthalmic Solution 1 Drop(s) Both EYES three times a day  escitalopram 10 milliGRAM(s) Oral daily  latanoprost 0.005% Ophthalmic Solution 1 Drop(s) Both EYES at bedtime  melatonin 3 milliGRAM(s) Oral at bedtime  memantine 10 milliGRAM(s) Oral two times a day  sodium chloride 0.9%. 1000 milliLiter(s) (80 mL/Hr) IV Continuous <Continuous>  tamsulosin 0.4 milliGRAM(s) Oral two times a day    MEDICATIONS  (PRN):      No Known Allergies      LABS                        13.5   8.86  )-----------( 130      ( 2019 06:09 )             41.1         140  |  106  |  16  ----------------------------<  119<H>  3.8   |  25  |  0.83    Ca    8.6      2019 06:09  Mg     1.7         TPro  6.8  /  Alb  3.7  /  TBili  0.8  /  DBili  x   /  AST  14  /  ALT  15  /  AlkPhos  83  24    PT/INR - ( 2019 06:09 )   PT: 24.9 sec;   INR: 2.15          PTT - ( 2019 06:09 )  PTT:33.4 sec  Urinalysis Basic - ( 2019 11:01 )    Color: Yellow / Appearance: Clear / S.025 / pH: x  Gluc: x / Ketone: Trace mg/dL  / Bili: Negative / Urobili: 0.2 E.U./dL   Blood: x / Protein: NEGATIVE mg/dL / Nitrite: NEGATIVE   Leuk Esterase: NEGATIVE / RBC: < 5 /HPF / WBC < 5 /HPF   Sq Epi: x / Non Sq Epi: 0-5 /HPF / Bacteria: Present /HPF            IMAGING/EKG/ETC  EKG:  Xray:  CT:  MRI: O/N Events: ANANYA  Subjective/ROS: Met pt at bedside, resting in bed. Oriented x0. Follows commands with delayed response. Nonverbal.       VITALS  Vital Signs Last 24 Hrs  T(C): 36.3 (2019 04:57), Max: 36.7 (2019 23:09)  T(F): 97.4 (2019 04:57), Max: 98 (2019 23:09)  HR: 50 (2019 04:57) (46 - 60)  BP: 124/68 (2019 04:57) (124/68 - 141/72)  BP(mean): --  RR: 19 (2019 04:57) (16 - 19)  SpO2: 95% (2019 04:57) (92% - 96%)    CAPILLARY BLOOD GLUCOSE      POCT Blood Glucose.: 158 mg/dL (2019 22:36)      PHYSICAL EXAM  General: pleasant, gentleman with almost child like demeanor, A&Ox0; NAD  Head: NC/AT; MMM; PERRL; EOMI;  Neck: Supple; no JVD  Respiratory: CTA B/L; no wheezes/crackles   Cardiovascular: Regular rhythm/rate; S1/S2   Gastrointestinal: Soft; NTND; normoactive BS  Extremities: WWP; 2+ pitting bilateral LE edema   Neurological:  A&Ox0, follows commands with delayed response. Nonverbal.     MEDICATIONS  (STANDING):  aspirin enteric coated 81 milliGRAM(s) Oral at bedtime  atorvastatin 20 milliGRAM(s) Oral at bedtime  diVALproex  milliGRAM(s) Oral two times a day  donepezil 10 milliGRAM(s) Oral at bedtime  dorzolamide 2% Ophthalmic Solution 1 Drop(s) Both EYES three times a day  escitalopram 10 milliGRAM(s) Oral daily  latanoprost 0.005% Ophthalmic Solution 1 Drop(s) Both EYES at bedtime  melatonin 3 milliGRAM(s) Oral at bedtime  memantine 10 milliGRAM(s) Oral two times a day  sodium chloride 0.9%. 1000 milliLiter(s) (80 mL/Hr) IV Continuous <Continuous>  tamsulosin 0.4 milliGRAM(s) Oral two times a day    MEDICATIONS  (PRN):      No Known Allergies      LABS                        13.5   8.86  )-----------( 130      ( 2019 06:09 )             41.1         140  |  106  |  16  ----------------------------<  119<H>  3.8   |  25  |  0.83    Ca    8.6      2019 06:09  Mg     1.7     25    TPro  6.8  /  Alb  3.7  /  TBili  0.8  /  DBili  x   /  AST  14  /  ALT  15  /  AlkPhos  83  06-24    PT/INR - ( 2019 06:09 )   PT: 24.9 sec;   INR: 2.15          PTT - ( 2019 06:09 )  PTT:33.4 sec  Urinalysis Basic - ( 2019 11:01 )    Color: Yellow / Appearance: Clear / S.025 / pH: x  Gluc: x / Ketone: Trace mg/dL  / Bili: Negative / Urobili: 0.2 E.U./dL   Blood: x / Protein: NEGATIVE mg/dL / Nitrite: NEGATIVE   Leuk Esterase: NEGATIVE / RBC: < 5 /HPF / WBC < 5 /HPF   Sq Epi: x / Non Sq Epi: 0-5 /HPF / Bacteria: Present /HPF            IMAGING/EKG/ETC  EKG:  Xray:  CT:  MRI:

## 2019-06-26 ENCOUNTER — APPOINTMENT (OUTPATIENT)
Dept: HEART AND VASCULAR | Facility: CLINIC | Age: 74
End: 2019-06-26

## 2019-06-26 ENCOUNTER — TRANSCRIPTION ENCOUNTER (OUTPATIENT)
Age: 74
End: 2019-06-26

## 2019-06-26 LAB
ANION GAP SERPL CALC-SCNC: 8 MMOL/L — SIGNIFICANT CHANGE UP (ref 5–17)
APTT BLD: 34.5 SEC — SIGNIFICANT CHANGE UP (ref 27.5–36.3)
BLD GP AB SCN SERPL QL: NEGATIVE — SIGNIFICANT CHANGE UP
BUN SERPL-MCNC: 14 MG/DL — SIGNIFICANT CHANGE UP (ref 7–23)
CALCIUM SERPL-MCNC: 8.3 MG/DL — LOW (ref 8.4–10.5)
CHLORIDE SERPL-SCNC: 108 MMOL/L — SIGNIFICANT CHANGE UP (ref 96–108)
CO2 SERPL-SCNC: 26 MMOL/L — SIGNIFICANT CHANGE UP (ref 22–31)
CREAT SERPL-MCNC: 0.75 MG/DL — SIGNIFICANT CHANGE UP (ref 0.5–1.3)
GLUCOSE SERPL-MCNC: 116 MG/DL — HIGH (ref 70–99)
HCT VFR BLD CALC: 42.9 % — SIGNIFICANT CHANGE UP (ref 39–50)
HCT VFR BLD CALC: 43.5 % — SIGNIFICANT CHANGE UP (ref 39–50)
HGB BLD-MCNC: 14.1 G/DL — SIGNIFICANT CHANGE UP (ref 13–17)
HGB BLD-MCNC: 14.2 G/DL — SIGNIFICANT CHANGE UP (ref 13–17)
INR BLD: 2.44 — HIGH (ref 0.88–1.16)
MAGNESIUM SERPL-MCNC: 1.8 MG/DL — SIGNIFICANT CHANGE UP (ref 1.6–2.6)
MCHC RBC-ENTMCNC: 31.5 PG — SIGNIFICANT CHANGE UP (ref 27–34)
MCHC RBC-ENTMCNC: 31.7 PG — SIGNIFICANT CHANGE UP (ref 27–34)
MCHC RBC-ENTMCNC: 32.6 GM/DL — SIGNIFICANT CHANGE UP (ref 32–36)
MCHC RBC-ENTMCNC: 32.9 GM/DL — SIGNIFICANT CHANGE UP (ref 32–36)
MCV RBC AUTO: 96 FL — SIGNIFICANT CHANGE UP (ref 80–100)
MCV RBC AUTO: 97.1 FL — SIGNIFICANT CHANGE UP (ref 80–100)
NRBC # BLD: 0 /100 WBCS — SIGNIFICANT CHANGE UP (ref 0–0)
NRBC # BLD: 0 /100 WBCS — SIGNIFICANT CHANGE UP (ref 0–0)
PLATELET # BLD AUTO: 130 K/UL — LOW (ref 150–400)
PLATELET # BLD AUTO: 145 K/UL — LOW (ref 150–400)
POTASSIUM SERPL-MCNC: 3.7 MMOL/L — SIGNIFICANT CHANGE UP (ref 3.5–5.3)
POTASSIUM SERPL-SCNC: 3.7 MMOL/L — SIGNIFICANT CHANGE UP (ref 3.5–5.3)
PROTHROM AB SERPL-ACNC: 28.3 SEC — HIGH (ref 10–12.9)
RBC # BLD: 4.47 M/UL — SIGNIFICANT CHANGE UP (ref 4.2–5.8)
RBC # BLD: 4.48 M/UL — SIGNIFICANT CHANGE UP (ref 4.2–5.8)
RBC # FLD: 12.2 % — SIGNIFICANT CHANGE UP (ref 10.3–14.5)
RBC # FLD: 12.2 % — SIGNIFICANT CHANGE UP (ref 10.3–14.5)
RH IG SCN BLD-IMP: POSITIVE — SIGNIFICANT CHANGE UP
SODIUM SERPL-SCNC: 142 MMOL/L — SIGNIFICANT CHANGE UP (ref 135–145)
WBC # BLD: 12.55 K/UL — HIGH (ref 3.8–10.5)
WBC # BLD: 9.2 K/UL — SIGNIFICANT CHANGE UP (ref 3.8–10.5)
WBC # FLD AUTO: 12.55 K/UL — HIGH (ref 3.8–10.5)
WBC # FLD AUTO: 9.2 K/UL — SIGNIFICANT CHANGE UP (ref 3.8–10.5)

## 2019-06-26 PROCEDURE — 99233 SBSQ HOSP IP/OBS HIGH 50: CPT | Mod: GC

## 2019-06-26 RX ORDER — WARFARIN SODIUM 2.5 MG/1
3 TABLET ORAL ONCE
Refills: 0 | Status: COMPLETED | OUTPATIENT
Start: 2019-06-26 | End: 2019-06-26

## 2019-06-26 RX ADMIN — DONEPEZIL HYDROCHLORIDE 10 MILLIGRAM(S): 10 TABLET, FILM COATED ORAL at 21:57

## 2019-06-26 RX ADMIN — Medication 3 MILLIGRAM(S): at 21:57

## 2019-06-26 RX ADMIN — MEMANTINE HYDROCHLORIDE 10 MILLIGRAM(S): 10 TABLET ORAL at 06:21

## 2019-06-26 RX ADMIN — MEMANTINE HYDROCHLORIDE 10 MILLIGRAM(S): 10 TABLET ORAL at 17:37

## 2019-06-26 RX ADMIN — TAMSULOSIN HYDROCHLORIDE 0.4 MILLIGRAM(S): 0.4 CAPSULE ORAL at 17:37

## 2019-06-26 RX ADMIN — ESCITALOPRAM OXALATE 10 MILLIGRAM(S): 10 TABLET, FILM COATED ORAL at 11:07

## 2019-06-26 RX ADMIN — DIVALPROEX SODIUM 250 MILLIGRAM(S): 500 TABLET, DELAYED RELEASE ORAL at 06:21

## 2019-06-26 RX ADMIN — Medication 81 MILLIGRAM(S): at 21:57

## 2019-06-26 RX ADMIN — TAMSULOSIN HYDROCHLORIDE 0.4 MILLIGRAM(S): 0.4 CAPSULE ORAL at 06:20

## 2019-06-26 RX ADMIN — DORZOLAMIDE HYDROCHLORIDE 1 DROP(S): 20 SOLUTION/ DROPS OPHTHALMIC at 06:21

## 2019-06-26 RX ADMIN — WARFARIN SODIUM 3 MILLIGRAM(S): 2.5 TABLET ORAL at 21:54

## 2019-06-26 RX ADMIN — SODIUM CHLORIDE 80 MILLILITER(S): 9 INJECTION INTRAMUSCULAR; INTRAVENOUS; SUBCUTANEOUS at 04:49

## 2019-06-26 RX ADMIN — ATORVASTATIN CALCIUM 20 MILLIGRAM(S): 80 TABLET, FILM COATED ORAL at 21:57

## 2019-06-26 RX ADMIN — DIVALPROEX SODIUM 250 MILLIGRAM(S): 500 TABLET, DELAYED RELEASE ORAL at 17:37

## 2019-06-26 RX ADMIN — DORZOLAMIDE HYDROCHLORIDE 1 DROP(S): 20 SOLUTION/ DROPS OPHTHALMIC at 21:58

## 2019-06-26 RX ADMIN — LATANOPROST 1 DROP(S): 0.05 SOLUTION/ DROPS OPHTHALMIC; TOPICAL at 21:59

## 2019-06-26 NOTE — PROGRESS NOTE ADULT - PROBLEM SELECTOR PLAN 4
Patient has known history of paroxsymal AFib.  - holding acebutolol in setting of bradycardia  - c/w coumadin dosed by INR level      *************Addendum*************  Spoke to pt's PCP Dr. Delaney who stated that he in unsure why Mr. Beasley has been taking this medication but he has been on it for the past 10 years without complication. States that he is not opposed to it being discontinued.

## 2019-06-26 NOTE — PROGRESS NOTE ADULT - ASSESSMENT
Patient is a 74yoM with advanced Alzheimers dementia, DM, Afib, AVR (on Coumadin) and BPH who presents from home to [facility] ED with increased lethargy and weakness.      Problem/Plan - 1:  ·  Problem: Weakness.  Plan: - TSH wnl.   -CTH unremarkable for hemorrhage or infarct. CTA/P with no acute pathology.   DDx: transient cytokine increase due to Shingrex vaccine   - Pt has had falls at home and has LE edema, will f/u dopplers to r/o additional cause of fall/weakness  - PT consulted  - Continue to monitor for changes in mental status.     Problem/Plan - 2:  ·  Problem: Alzheimer disease.  Plan: Patient has history of advancing Alzheimer dementia.   -Wife is current primary caregiver. She is concerned about his care and wishes for him to be evaluated for hospice care.  - Home meds Namzaric 28/10mg, Escitalopram 10mg daily, Depakote DR 250mg BID.   - c/w home meds (therapeutic exchange for Namzaric is 28mg Namenda and 10mg  Aricept  - DNR/DNI status  - Palliative consulted; recs appreciated   - SW consult.     Problem/Plan - 3:  ·  Problem: Diabetes.  Plan: Pt has known history of diabetes. Per wife, not currently taking any meds.  - f/u A1c  - MISS.     Problem/Plan - 4:  ·  Problem: Afib.  Plan: Patient has known history of paroxsymal AFib.  - holding acebutolol in setting of bradycardia  - c/w coumadin    *************Addendum*************  Spoke to pt's PCP Dr. Delaney who stated that he in unsure why Mr. Beasley has been taking this medication but he has been on it for the past 10 years without complication. States that he is not opposed to it being discontinued.    Problem/Plan - 5:  ·  Problem: BPH (benign prostatic hyperplasia).  Plan: Pt takes home meds Tamsulosin 0.4mg BID and Finasteride 5mg daily.   - c/w home meds.     Problem/Plan - 6:  Problem: Aortic valve replaced. Plan: Pt has history of aortic valve replacement. On home coumadin, alternates between 2mg and 3mg daily.   -Per outpatient note, goal INR of 2.5-3.5.  - c/w 3mg Coumadin tonight given INR 2.15 this AM  - will continue to dose Coumadin daily by INR level   - dose Coumadin by INR.    Problem/Plan - 7:  ·  Problem: Bradycardia.  Plan: -Patient noted to have bradycardia with HR in the 40s.    - Pt with sinus bradycardia on [employer] records in outpatient cardiologist's office on 2017 EKG.  - continue to hold home Acebutolol  - will contact Dr. Delaney to find out collateral on dose of acebutolol  - avoid AV blocking agents  *************Addendum*************  Spoke to pt's PCP Dr. Delaney who stated that he in unsure why Mr. Beasley has been taking this medication but he has been on it for the past 10 years without complication. States that he is not opposed to it being discontinued.    Problem/Plan - 8:  ·  Problem: Nutrition, metabolism, and development symptoms.  Plan: F: none  E: replete as needed  N: regular diet.     Problem/Plan - 9:  ·  Problem: Prophylactic measure.  Plan: DVT: none  GI: none  Dispo: [facility]  DNR/DNI.
Patient is a 74yoM with advanced Alzheimers dementia, DM, Afib, AVR (on Coumadin) and BPH who presents from home to [facility] ED with increased lethargy and weakness.
Patient is a 74yoM with advanced Alzheimers dementia, DM, Afib, AVR (on Coumadin) and BPH who presents from home to [facility] ED with increased lethargy and weakness.

## 2019-06-26 NOTE — PROGRESS NOTE ADULT - PROBLEM SELECTOR PLAN 7
-Patient noted to have bradycardia with HR in the 40s.    - Pt with sinus bradycardia on [employer] records in outpatient cardiologist's office on 2017 EKG.  - continue to hold home Acebutolol  - Per discussion with Dr. Delaney, okay to discontinue Acebutolol  - avoid AV blocking agents  *************Addendum*************  Spoke to pt's PCP Dr. Delaney who stated that he in unsure why Mr. Beasley has been taking this medication but he has been on it for the past 10 years without complication. States that he is not opposed to it being discontinued.

## 2019-06-26 NOTE — CONSULT NOTE ADULT - SUBJECTIVE AND OBJECTIVE BOX
::HPI::  Patient is a 74yoM with advanced Alzheimers dementia, DM, Afib, AVR (on Coumadin) and BPH who presents from home to [facility] ED with increased lethargy and weakness. Patient presents with his wife and at bedside. History is per wife. Wife reports that on Saturday, pt received his second dose of Shingrex vaccine then on Sunday, he had increased lethargy and weakness. Wife states that the pt would not reason and was very sleepy. She describes him as being "semi-comatose." Pt's wife also reports that pt had reduced PO intake over the last day. Pt's wife states that since coming to the hospital, pt has been more awake and alert than the previous day. Pt's wife reports that at baseline he ambulates with a walker, follows simple commands and acknowledges people in conversation. Pt's wife is pt's primary caregiver. She is concerned that patient may need hospice care. She does not wish to have her  treated with antibiotics and states that he is DNR/DNI. Of note, pt's wife reports that she was walking with the pt last week and he tripped over a hose on the ground and fell onto his right side onto a short fence. Wife denies head trauma or loss of consciousness and states that the patient was able to get up and ambulate after the event. (24 Jun 2019 15:36)    :: NOTE::   Patient is as stated above.  Wife is at bedside and answers questions, patient does not respond well/non verbal.   team consulted due to patient w/ hematuria.  Patient is on Warfarin, and ASA 81 for anticoagulation.  Wife states that patient did have BPH, but has never had an episode of suprapubic pain, hematuria, dysuria. Patient had a [place] catheter prior to episode of hematuria, as well as wife stating that he has been incontinent and she usually puts adult diapers on him. She never noted hematuria until this hospital visit. Patient denies dysuria, f/c/n/v, abdominal pain, back pain, dysuria,       Vital Signs Last 24 Hrs  T(C): 36.4 (26 Jun 2019 14:06), Max: 36.4 (25 Jun 2019 21:27)  T(F): 97.6 (26 Jun 2019 14:06), Max: 97.6 (25 Jun 2019 21:27)  HR: 68 (26 Jun 2019 14:06) (53 - 68)  BP: 132/65 (26 Jun 2019 14:06) (132/65 - 161/73)  BP(mean): --  RR: 18 (26 Jun 2019 14:06) (18 - 20)  SpO2: 96% (26 Jun 2019 14:06) (95% - 96%)  I&O's Summary    25 Jun 2019 07:01  -  26 Jun 2019 07:00  --------------------------------------------------------  IN: 880 mL / OUT: 0 mL / NET: 880 mL        PE:  Gen: NAD, alert and oriented x 0  Abd: soft nt/nd  : Incontinent, Towel over penis w/ light red output.   MAR: Deferred.     LABS:                        14.2   12.55 )-----------( 145      ( 26 Jun 2019 15:33 )             43.5     06-26    142  |  108  |  14  ----------------------------<  116<H>  3.7   |  26  |  0.75    Ca    8.3<L>      26 Jun 2019 06:52  Mg     1.8     06-26      PT/INR - ( 26 Jun 2019 06:52 )   PT: 28.3 sec;   INR: 2.44          PTT - ( 26 Jun 2019 06:52 )  PTT:34.5 sec  Cultures

## 2019-06-26 NOTE — PROGRESS NOTE ADULT - ATTENDING COMMENTS
Patient was seen and examined with the resident team today.  I agree with Dr. Temple's assessment and plan with the following exceptions/additions:     Briefly, this is a 75yo gentleman with a PMh of rapidly progressive, advanced Azheimer's dementia, NIDDM2, Afib, AVR (on Coumadin) and BPH who p/w increased lethargy and weakness following his 2nd Shingrix dose, as well as ongoing cognitive decline with increased gait instability.  Clinically he improved w/no medical intervention; thus, suspect his initial presentation likely related to vaccine side effect.  Pt was nearing discharge this AM to [facility] when he was noted to have gross hematuria this AM.  VSS.  Exam unchanged from baseline.  Labs - Hb 14.1, plt 130, INR 2.44, Cr 0.75, no RBCs on micro (U/A from admission).     -- repeat U/A  -- would continue Coumadin as HD stable, Hb up today and s/p AV replacement   -- continue to hold beta blocker  -- f/u Urology recs  -- DVT PPx - on therapeutic Coumadin  -- Dispo - [facility] w/transition to long term care    Iesha Kate  [phone]

## 2019-06-26 NOTE — PROGRESS NOTE ADULT - PROBLEM SELECTOR PLAN 2
Patient has history of advancing Alzheimer dementia.   -Wife is current primary caregiver.   - Plan for DC to [facility] as pt does not qualify for hospice  - Home meds Namzaric 28/10mg, Escitalopram 10mg daily, Depakote DR 250mg BID.   - c/w home meds (therapeutic exchange for Namzaric is 28mg Namenda and 10mg  Aricept  - DNR/DNI status  - Palliative consulted; recs appreciated   - SW consult
Patient has history of advancing Alzheimer dementia.   -Wife is current primary caregiver. She is concerned about his care and wishes for him to be evaluated for hospice care.  - Home meds Namzaric 28/10mg, Escitalopram 10mg daily, Depakote DR 250mg BID.   - c/w home meds (therapeutic exchange for Namzaric is 28mg Namenda and 10mg  Aricept  - DNR/DNI status  - Palliative consulted; recs appreciated   - SW consult

## 2019-06-26 NOTE — DISCHARGE NOTE PROVIDER - CARE PROVIDERS DIRECT ADDRESSES
,DirectAddress_Unknown,[email or web address]

## 2019-06-26 NOTE — DISCHARGE NOTE PROVIDER - PROVIDER TOKENS
FREE:[LAST:[Clinic],FIRST:[Urology],PHONE:[phone]],FAX:[phone]    -],ADDRESS:[U[facility] Clinic   [address] [place] [address]],FOLLOWUP:[2 weeks]],PROVIDER:[[id number]],FOLLOWUP:[2 weeks]]

## 2019-06-26 NOTE — DISCHARGE NOTE PROVIDER - HOSPITAL COURSE
74M with advanced Alzheimers dementia, DM, Afib, AVR (on coumadin) and BPH who presented from home to [facility] ED with increased lethargy and weakness. Patient presented with his wife and at bedside. History is per wife. Wife reports that on Saturday, pt received his second dose of Shingrex vaccine then on Sunday, he had increased lethargy and weakness. Wife states that the pt would not reason and was very sleepy. She describes him as being "semi-comatose." Pt's wife also reports that pt had reduced PO intake over the last day. Pt's wife states that since coming to the hospital, pt has been more awake and alert than the previous day. Pt's wife reports that at baseline he ambulates with a walker, follows simple commands and acknowledges people in conversation. Pt's wife is pt's primary caregiver. She is concerned that patient may need hospice care. She does not wish to have her  treated with antibiotics and states that he is DNR/DNI. Of note, pt's wife reports that she was walking with the pt last week and he tripped over a hose on the ground and fell onto his right side onto a short fence. Wife denies head trauma or loss of consciousness and states that the patient was able to get up and ambulate after the event. 74M with advanced Alzheimers dementia, DM, Afib, AVR (on coumadin) and BPH who presented from home to [facility] ED with increased lethargy and weakness. Patient presented with his wife and at bedside. History is per wife. Wife reports that on Saturday, pt received his second dose of Shingrex vaccine and then had increased lethargy and weakness. Wife states that the pt was confused and and was very sleepy.         She describes him as being "semi-comatose." Pt's wife also reports that pt had reduced PO intake over the last day. Pt's wife states that since coming to the hospital, pt has been more awake and alert than the previous day. Pt's wife reports that at baseline he ambulates with a walker, follows simple commands and acknowledges people in conversation. Pt's wife is pt's primary caregiver. She is concerned that patient may need hospice care. She does not wish to have her  treated with antibiotics and states that he is DNR/DNI. Of note, pt's wife reports that she was walking with the pt last week and he tripped over a hose on the ground and fell onto his right side onto a short fence. Wife denies head trauma or loss of consciousness and states that the patient was able to get up and ambulate after the event. 74M with advanced Alzheimers dementia, DM, Afib, AVR (on coumadin) and BPH who presented from home to [facility] ED with increased lethargy and weakness. Patient's dementia advanced rapidly over the past year and he is now oriented to person at best, but often oriented x0. His wife reports that on Saturday, pt received his second dose of Shingrex vaccine and then had increased lethargy and weakness with associated confusion.          She describes him as being "semi-comatose." Pt's wife also reports that pt had reduced PO intake over the last day. Pt's wife states that since coming to the hospital, pt has been more awake and alert than the previous day. Pt's wife reports that at baseline he ambulates with a walker, follows simple commands and acknowledges people in conversation. Pt's wife is pt's primary caregiver. She is concerned that patient may need hospice care. She does not wish to have her  treated with antibiotics and states that he is DNR/DNI. Of note, pt's wife reports that she was walking with the pt last week and he tripped over a hose on the ground and fell onto his right side onto a short fence. Wife denies head trauma or loss of consciousness and states that the patient was able to get up and ambulate after the event. 74M with advanced Alzheimers dementia, DM, Afib, AVR (on coumadin) and BPH who presented from home to [facility] ED with increased lethargy and weakness. Patient's dementia advanced rapidly over the past year and he is now oriented to person at best, but often oriented x0. His wife reports that on Saturday, pt received his second dose of Shingrex vaccine and then had increased lethargy,  weakness with associated confusion and decreased PO intake for 1 day. Pt's wife reports that at baseline he ambulates with a walker, follows simple commands and acknowledges people in conversation. Pt's wife is pt's primary caregiver. Pt's wife and family wanted to pursue hospice care but pt did not qualify, so is being discharged to [facility]. Pt's wife does not wish to have her  treated with antibiotics and states that he is DNR/DNI. On day prior to discharge, pt developed hematuria but remained hemodynamically stable and  was without decreased in hemoglobin. Urology was consulted and did not find clots or need for intervention during admission. Urine cytology labs were sent and pt was provided information for outpatient follow up with Urology clinic at [facility] upon discharge. Pt hemodynamically stable for discharge to [facility]. 74M with advanced Alzheimers dementia, DM, Afib, AVR (on coumadin) and BPH who presented from home to [facility] ED with increased lethargy and weakness. Patient's dementia advanced rapidly over the past year and he is now oriented to person at best, but often oriented x0. His wife reports that on Saturday, pt received his second dose of Shingrex vaccine and then had increased lethargy,  weakness with associated confusion and decreased PO intake for 1 day. Pt's wife reports that at baseline he ambulates with a walker, follows simple commands and acknowledges people in conversation. Pt continues to remain oriented x0. Pt's wife is pt's primary caregiver. Pt's wife and family wanted to pursue hospice care but pt did not qualify, so is being discharged to [facility]. Pt's wife does not wish to have her  treated with antibiotics and states that he is DNR/DNI. On day prior to discharge, pt developed hematuria but remained hemodynamically stable and  was without decreased in hemoglobin. Urology was consulted and did not find clots or need for intervention during admission. Urine cytology labs were sent and pt was provided information for outpatient follow up with Urology clinic at [facility] upon discharge. Pt hemodynamically stable for discharge to [facility].

## 2019-06-26 NOTE — DISCHARGE NOTE PROVIDER - NSDCCPCAREPLAN_GEN_ALL_CORE_FT
PRINCIPAL DISCHARGE DIAGNOSIS  Diagnosis: Weakness  Assessment and Plan of Treatment: When you came to the hospital you were weak and confused and had not been eating as much as normal. We rehydrated you with intravenous (IV) fluids and monitored your vital signs. Your confusion improved and you are safe for discharge. Please follow up with your primary care doctor, Dr. Delaney when you are discharged.      SECONDARY DISCHARGE DIAGNOSES  Diagnosis: Hematuria  Assessment and Plan of Treatment: While you were in the hospital, you had blood in your urine (hematuria). The urology team evaluated you and did not find a need for intervention during this admission. The blood in your urine has decreased and you are safe to be discharged to home. Please follow up with the Urology clinic when you are discharged.    Diagnosis: History of aortic valve repair  Assessment and Plan of Treatment: Please continue to take your home medications as prescribed. We have been dosing your coumadin daily based on your INR level. Please continue to have your INR checked daily so that your coumadin can be dosed appropriately. Your INR goal is 2.5 - 3.5.    Diagnosis: History of BPH  Assessment and Plan of Treatment: Please continue to take your home medications as prescribed.    Diagnosis: Diabetes mellitus  Assessment and Plan of Treatment: Please continue to take your home medicatio n as prescribed.    Diagnosis: Alzheimer disease  Assessment and Plan of Treatment: Please continue to take your home medications as prescribed. PRINCIPAL DISCHARGE DIAGNOSIS  Diagnosis: Weakness  Assessment and Plan of Treatment: When you came to the hospital you were weak and confused and had not been eating as much as normal. You  continue to remain oriented x0. We rehydrated you with intravenous (IV) fluids and monitored your vital signs. Your confusion improved and you are safe for discharge. Please follow up with your primary care doctor, Dr. Delaney when you are discharged.      SECONDARY DISCHARGE DIAGNOSES  Diagnosis: Hematuria  Assessment and Plan of Treatment: While you were in the hospital, you had blood in your urine (hematuria). The urology team evaluated you and did not find a need for intervention during this admission. The blood in your urine has decreased and you are safe to be discharged to home. Please follow up with the Urology clinic when you are discharged.    Diagnosis: History of aortic valve repair  Assessment and Plan of Treatment: Please continue to take your home medications as prescribed. We have been dosing your coumadin daily based on your INR level. Please continue to have your INR checked daily so that your coumadin can be dosed appropriately. Your INR goal is 2.5 - 3.5.    Diagnosis: History of BPH  Assessment and Plan of Treatment: Please continue to take your home medications as prescribed.    Diagnosis: Diabetes mellitus  Assessment and Plan of Treatment: Please continue to take your home medicatio n as prescribed.    Diagnosis: Alzheimer disease  Assessment and Plan of Treatment: Please continue to take your home medications as prescribed.

## 2019-06-26 NOTE — PROGRESS NOTE ADULT - PROBLEM SELECTOR PLAN 6
Pt has history of aortic valve replacement. On home coumadin, alternates between 2mg and 3mg daily.   -Per outpatient note, goal INR of 2.5-3.5.  - c/w 3mg Coumadin tonight given INR 2.44 this AM  - will continue to dose Coumadin daily by INR level
Pt has history of aortic valve replacement. On home coumadin, alternates between 2mg and 3mg daily.   -Per outpatient note, goal INR of 2.5-3.5.  - c/w 3mg Coumadin tonight given INR 2.15 this AM  - will continue to dose Coumadin daily by INR level   - dose Coumadin by INR

## 2019-06-26 NOTE — PROGRESS NOTE ADULT - PROBLEM SELECTOR PLAN 5
Pt takes home meds Tamsulosin 0.4mg BID and Finasteride 5mg daily.   - c/w home meds  - This AM called to bedside  by nurse for hematuria with sergey blood at meatus  - Pt without indwelling catheter or external catheter which could be obvious causes of trauma to meatus  - AM labs stable without decrease in Hgb  - Urology consulted; no signs of clot or urinary retention; per Urology no need for intervention. Hematuria likely due to AC.   - c/w monitoring of CBC and signs of bleeding  - Pt to [facility] outpatient Urology clinic at [facility] upon  [phone]  - f/w repeat CBC this afternoon
Pt takes home meds Tamsulosin 0.4mg BID and Finasteride 5mg daily.   - c/w home meds

## 2019-06-26 NOTE — DISCHARGE NOTE PROVIDER - CARE PROVIDER_API CALL
Clinic, Urology  Urology Clinic   [address] [place] [address]  Phone: [phone]  Fax: (   )    -  Follow Up Time: 2 weeks    Asia Delaney)  Cardiovascular Disease; Internal Medicine  [address], [address] [place] [address]  Phone: [phone]  Fax: [phone]  Follow Up Time: 2 weeks

## 2019-06-26 NOTE — CONSULT NOTE ADULT - ASSESSMENT
Patient is a 74 male w/ hematuria likely from BPH and anticoagulation w/ coumadin/ASA.  Patient is afebrile, VSS, and HDS.  Bladder scan was performed by  team to rule out retention.  Bladder scan was 40cc.    Recs:  -Continue Tamsulosin  -Send UA Cytology  -Monitor Urine output color  -Call and follow up to Urology Clinic [phone]

## 2019-06-26 NOTE — PROGRESS NOTE ADULT - SUBJECTIVE AND OBJECTIVE BOX
Physical Medicine and Rehabilitation Progress Note:    Patient is a 74y old  Male who presents with a chief complaint of Weakness (26 Jun 2019 08:13)      HPI:  Patient is a 74yoM with advanced Alzheimers dementia, DM, Afib, AVR (on Coumadin) and BPH who presents from home to [facility] ED with increased lethargy and weakness. Patient presents with his wife and at bedside. History is per wife. Wife reports that on Saturday, pt received his second dose of Shingrex vaccine then on Sunday, he had increased lethargy and weakness. Wife states that the pt would not reason and was very sleepy. She describes him as being "semi-comatose." Pt's wife also reports that pt had reduced PO intake over the last day. Pt's wife states that since coming to the hospital, pt has been more awake and alert than the previous day. Pt's wife reports that at baseline he ambulates with a walker, follows simple commands and acknowledges people in conversation. Pt's wife is pt's primary caregiver. She is concerned that patient may need hospice care. She does not wish to have her  treated with antibiotics and states that he is DNR/DNI. Of note, pt's wife reports that she was walking with the pt last week and he tripped over a hose on the ground and fell onto his right side onto a short fence. Wife denies head trauma or loss of consciousness and states that the patient was able to get up and ambulate after the event. (24 Jun 2019 15:36)                            14.1   9.20  )-----------( 130      ( 26 Jun 2019 06:52 )             42.9       06-26    142  |  108  |  14  ----------------------------<  116<H>  3.7   |  26  |  0.75    Ca    8.3<L>      26 Jun 2019 06:52  Mg     1.8     06-26      Vital Signs Last 24 Hrs  T(C): 36.3 (26 Jun 2019 04:33), Max: 36.4 (25 Jun 2019 21:27)  T(F): 97.3 (26 Jun 2019 04:33), Max: 97.6 (25 Jun 2019 21:27)  HR: 53 (26 Jun 2019 04:33) (53 - 53)  BP: 136/81 (26 Jun 2019 04:33) (136/81 - 161/73)  BP(mean): --  RR: 20 (26 Jun 2019 04:33) (18 - 20)  SpO2: 95% (26 Jun 2019 04:33) (95% - 96%)    MEDICATIONS  (STANDING):  aspirin enteric coated 81 milliGRAM(s) Oral at bedtime  atorvastatin 20 milliGRAM(s) Oral at bedtime  diVALproex  milliGRAM(s) Oral two times a day  donepezil 10 milliGRAM(s) Oral at bedtime  dorzolamide 2% Ophthalmic Solution 1 Drop(s) Both EYES three times a day  escitalopram 10 milliGRAM(s) Oral daily  latanoprost 0.005% Ophthalmic Solution 1 Drop(s) Both EYES at bedtime  melatonin 3 milliGRAM(s) Oral at bedtime  memantine 10 milliGRAM(s) Oral two times a day  sodium chloride 0.9%. 1000 milliLiter(s) (80 mL/Hr) IV Continuous <Continuous>  tamsulosin 0.4 milliGRAM(s) Oral two times a day    MEDICATIONS  (PRN):    Currently Undergoing Physical Therapy at bedside.    Functional Status Assessment:    Previous Level of Function:     · Ambulation Skills  needs device and assist    · Transfer Skills  needed assist    · ADL Skills  needed assist    · Work/Leisure Activity  needs device and assist    · Additional Comments  Social Hx obtained via wife and son at bedside. Pt's wife reports pt ambulates in the home without AD. And recently started amb outside with a walker s/p recent fall, and states pt shuffles feet when fatigued. Pt follows simple commands and acknowledges people in conversation, but is hard of hearing. Pt's wife is pt's primary caregiver and supervises ADLs on "good days" and provides moderate assist on "bad days". Pt's wife states she has had a hard time with transfers on occasion and is considering moving pt a long-term assisted living home in [place] where her mother lives.      Cognitive Status Examination:   · Orientation  person; able to state "yes" to own name    · Level of Consciousness  alert; mild confusion with situation but not agitated    · Follows Commands and Answers Questions  able to follow single-step instructions; 75% of the time; req increased time and increased encouragement to follow 1-step commands    · Personal Safety and Judgment  impaired; cognitively impaired, lacking situational awareness      Range of Motion Exam:   · Active Range of Motion Examination  bilateral  lower extremity Active ROM was WFL (within functional limits); bilateral upper extremity Active ROM was WFL (within functional limits)      Manual Muscle Testing:   · Manual Muscle Testing Results  grossly assessed due to  functional mobility tasks (unable to follow MMT commands) EUs and LEs =/> 3+/5,  strength 5/5 bilaterally      Bed Mobility: Rolling/Turning:     · Level of Douglas  moderate assist (50% patients effort)    · Physical Assist/Nonphysical Assist  verbal cues; 1 person assist    · Assistive Device  bed rails      Bed Mobility: Sit to Supine:     · Level of [place]  not assessed as pt returned to chair      Bed Mobility: Supine to Sit:     · Level of [place]  moderate assist (50% patients effort)    · Physical Assist/Nonphysical Assist  2 person assist; verbal cues      Bed Mobility Analysis:     · Bed Mobility Limitations  dec cognitive understanding of bed mobility testing    · Impairments Contributing to Impaired Bed Mobility  cognition; impaired coordination; pt unable to state impairments/sensation; decreased strength            Transfer: Sit to Stand:     · Level of Douglas  moderate assist (50% patients effort)    · Physical Assist/Nonphysical Assist  2 person assist    · Assistive Device  rolling walker      Transfer: Stand to Sit:     · Level of [place]  minimum assist (75% patients effort)    · Physical Assist/Nonphysical Assist  2 person assist    · Assistive Device  rolling walker      Sit/Stand Transfer Safety Analysis:     · Transfer Safety Concerns Noted  decreased safety awareness; losing balance; decreased sequencing ability    · Impairments Contributing to Impaired Transfers  impaired balance; cognition; impaired coordination; unsteadiness/shakiness in legs, dec aerobic capacity/endurance; decreased strength            Gait Skills:     · Level of [place]  minimum assist (75% patients effort)    · Physical Assist/Nonphysical Assist  2 person assist; verbal cues; nonverbal cues (demo/gestures); assist needed to manage RW    · Assistive Device  rolling walker    · Gait Distance  50 feet      Gait Analysis:     · Gait Pattern Used  reciprocal gait w/ RW    · Gait Deviations Noted  decreased step length; decreased stride length; shuffling gait with limited toe clearance, walking with RW too far away and RW not centered during turns    · Impairments Contributing to Gait Deviations  impaired balance; cognition; impaired coordination; unsteadiness, dec aerobic capacity/endurance; decreased strength            Stair Negotiation:     · Level of Douglas  not assessed 2/2 fatigue during amb (TBA)      Balance Skills Assessment:     · Sitting Balance: Static  fair balance    · Sitting Balance: Dynamic  fair minus    · Sit-to-Stand Balance  fair minus    · Standing Balance: Static  fair balance    · Standing Balance: Dynamic  fair minus    · Systems Impairment Contributing to Balance Disturbance  cognitive    · Identified Impairments Contributing to Balance Disturbance  impaired coordination; dec aerobic capacity/endurance; lack of situational awareness      Sensory Examination:   Sensory Examination:    Grossly Intact:   · Gross Sensory Examination  unable to assess 2/2 dec cognition; pt says "yes" to every question asked        Treatment Location:   · Comments  FIM gait: 2, FIM stairs: not assessed      Clinical Impressions:   · Criteria for Skilled Therapeutic Interventions  impairments found; functional limitations in following categories; anticipated discharge recommendation; risk reduction/prevention; rehab potential; therapy frequency    · Impairments Found (describe specific impairments)  aerobic capacity/endurance; arousal, attention, and cognition; poor safety awareness; gait, locomotion, and balance; posture; cognitive impairment    · Functional Limitations in Following Categories (describe specific limitations)  self-care; home management; community/leisure    · Risk Reduction/Prevention (Describe Specific Areas of risk reduction/prevention)  risk factors    · Risk Areas  fall; impaired judgment; cognitive impairment    · Rehab Potential  good, to achieve stated therapy goals    · Therapy Frequency  2-3x/week          PM&R Impression: as above    Disposition Plan Recommendations:  subacute rehab placement

## 2019-06-26 NOTE — PROGRESS NOTE ADULT - PROBLEM SELECTOR PLAN 1
- Pt has had falls at home and has LE edema,  - US doppler LE negative   - PT consulted  - Continue to monitor for changes in mental status
- TSH wnl.   -CTH unremarkable for hemorrhage or infarct. CTA/P with no acute pathology.   DDx: transient cytokine increase due to Shingrex vaccine   - Pt has had falls at home and has LE edema, will f/u dopplers to r/o additional cause of fall/weakness  - PT consulted  - Continue to monitor for changes in mental status

## 2019-06-26 NOTE — PROGRESS NOTE ADULT - PROBLEM SELECTOR PLAN 10
1) PCP Contacted on Admission: (Y/N) --> Name & Phone #: Dr. Asia Delaney [phone]  2) Date of Contact with PCP:  3) PCP Contacted at Discharge: (Y/N, N/A)  4) Summary of Handoff Given to PCP:   5) Post-Discharge Appointment Date and Location:
1) PCP Contacted on Admission: (Y/N) --> Name & Phone #: Dr. Asia Delaney [phone]  2) Date of Contact with PCP: 6/25/19  3) PCP Contacted at Discharge: (Y/N, N/A)  4) Summary of Handoff Given to PCP:   5) Post-Discharge Appointment Date and Location:

## 2019-06-26 NOTE — DISCHARGE NOTE PROVIDER - NSDCFUADDAPPT_GEN_ALL_CORE_FT
Please follow up with Urology Clinic when you are discharged [phone].   Please follow up with your primary care physician, Dr. Delaney when you are discharged.

## 2019-06-26 NOTE — PROGRESS NOTE ADULT - SUBJECTIVE AND OBJECTIVE BOX
INCOMPLETE NOTE  O/N Events:  Subjective/ROS: Denies HA, CP, SOB, n/v, changes in bowel/urinary habits.  12pt ROS otherwise negative.    VITALS  Vital Signs Last 24 Hrs  T(C): 36.4 (26 Jun 2019 14:06), Max: 36.4 (25 Jun 2019 21:27)  T(F): 97.6 (26 Jun 2019 14:06), Max: 97.6 (25 Jun 2019 21:27)  HR: 68 (26 Jun 2019 14:06) (53 - 68)  BP: 132/65 (26 Jun 2019 14:06) (132/65 - 161/73)  BP(mean): --  RR: 18 (26 Jun 2019 14:06) (18 - 20)  SpO2: 96% (26 Jun 2019 14:06) (95% - 96%)    CAPILLARY BLOOD GLUCOSE      POCT Blood Glucose.: 150 mg/dL (26 Jun 2019 12:04)  POCT Blood Glucose.: 112 mg/dL (26 Jun 2019 08:13)  POCT Blood Glucose.: 147 mg/dL (25 Jun 2019 22:07)  POCT Blood Glucose.: 174 mg/dL (25 Jun 2019 17:21)      PHYSICAL EXAM  General: A&Ox3; NAD  Head: NC/AT; MMM; PERRL; EOMI;  Neck: Supple; no JVD  Respiratory: CTA B/L; no wheezes/crackles   Cardiovascular: Regular rhythm/rate; S1/S2   Gastrointestinal: Soft; NTND; normoactive BS  Extremities: WWP; no edema/cyanosis  Neurological:  CNII-XII grossly intact; no obvious focal deficits    MEDICATIONS  (STANDING):  aspirin enteric coated 81 milliGRAM(s) Oral at bedtime  atorvastatin 20 milliGRAM(s) Oral at bedtime  diVALproex  milliGRAM(s) Oral two times a day  donepezil 10 milliGRAM(s) Oral at bedtime  dorzolamide 2% Ophthalmic Solution 1 Drop(s) Both EYES three times a day  escitalopram 10 milliGRAM(s) Oral daily  latanoprost 0.005% Ophthalmic Solution 1 Drop(s) Both EYES at bedtime  melatonin 3 milliGRAM(s) Oral at bedtime  memantine 10 milliGRAM(s) Oral two times a day  sodium chloride 0.9%. 1000 milliLiter(s) (80 mL/Hr) IV Continuous <Continuous>  tamsulosin 0.4 milliGRAM(s) Oral two times a day    MEDICATIONS  (PRN):      No Known Allergies      LABS                        14.1   9.20  )-----------( 130      ( 26 Jun 2019 06:52 )             42.9     06-26    142  |  108  |  14  ----------------------------<  116<H>  3.7   |  26  |  0.75    Ca    8.3<L>      26 Jun 2019 06:52  Mg     1.8     06-26      PT/INR - ( 26 Jun 2019 06:52 )   PT: 28.3 sec;   INR: 2.44          PTT - ( 26 Jun 2019 06:52 )  PTT:34.5 sec          IMAGING/EKG/ETC  EKG:  Xray:  CT:  MRI: O/N Events: ANANYA  Subjective/ROS: Met pt at bedside, resting in bed. Unable to provide answers to questions. Oriented x0. Follows commands.     VITALS  Vital Signs Last 24 Hrs  T(C): 36.4 (26 Jun 2019 14:06), Max: 36.4 (25 Jun 2019 21:27)  T(F): 97.6 (26 Jun 2019 14:06), Max: 97.6 (25 Jun 2019 21:27)  HR: 68 (26 Jun 2019 14:06) (53 - 68)  BP: 132/65 (26 Jun 2019 14:06) (132/65 - 161/73)  BP(mean): --  RR: 18 (26 Jun 2019 14:06) (18 - 20)  SpO2: 96% (26 Jun 2019 14:06) (95% - 96%)    CAPILLARY BLOOD GLUCOSE      POCT Blood Glucose.: 150 mg/dL (26 Jun 2019 12:04)  POCT Blood Glucose.: 112 mg/dL (26 Jun 2019 08:13)  POCT Blood Glucose.: 147 mg/dL (25 Jun 2019 22:07)  POCT Blood Glucose.: 174 mg/dL (25 Jun 2019 17:21)      PHYSICAL EXAM  General: A&Ox3; NAD  Head: NC/AT; MMM; PERRL; EOMI;  Neck: Supple; no JVD  Respiratory: CTA B/L; no wheezes/crackles   Cardiovascular: Regular rhythm/rate; S1/S2   Gastrointestinal: Soft; NTND; normoactive BS  Extremities: WWP; no edema/cyanosis  Neurological:  CNII-XII grossly intact; no obvious focal deficits    MEDICATIONS  (STANDING):  aspirin enteric coated 81 milliGRAM(s) Oral at bedtime  atorvastatin 20 milliGRAM(s) Oral at bedtime  diVALproex  milliGRAM(s) Oral two times a day  donepezil 10 milliGRAM(s) Oral at bedtime  dorzolamide 2% Ophthalmic Solution 1 Drop(s) Both EYES three times a day  escitalopram 10 milliGRAM(s) Oral daily  latanoprost 0.005% Ophthalmic Solution 1 Drop(s) Both EYES at bedtime  melatonin 3 milliGRAM(s) Oral at bedtime  memantine 10 milliGRAM(s) Oral two times a day  sodium chloride 0.9%. 1000 milliLiter(s) (80 mL/Hr) IV Continuous <Continuous>  tamsulosin 0.4 milliGRAM(s) Oral two times a day    MEDICATIONS  (PRN):      No Known Allergies      LABS                        14.1   9.20  )-----------( 130      ( 26 Jun 2019 06:52 )             42.9     06-26    142  |  108  |  14  ----------------------------<  116<H>  3.7   |  26  |  0.75    Ca    8.3<L>      26 Jun 2019 06:52  Mg     1.8     06-26      PT/INR - ( 26 Jun 2019 06:52 )   PT: 28.3 sec;   INR: 2.44          PTT - ( 26 Jun 2019 06:52 )  PTT:34.5 sec          IMAGING/EKG/ETC  EKG:  Xray:  CT:  MRI:

## 2019-06-26 NOTE — PROGRESS NOTE ADULT - PROBLEM SELECTOR PLAN 3
Pt has known history of diabetes. Per wife, not currently taking any meds.  - A1c  - MISS
Pt has known history of diabetes. Per wife, not currently taking any meds.  - f/u A1c  - MISS

## 2019-06-27 ENCOUNTER — TRANSCRIPTION ENCOUNTER (OUTPATIENT)
Age: 74
End: 2019-06-27

## 2019-06-27 VITALS
DIASTOLIC BLOOD PRESSURE: 77 MMHG | OXYGEN SATURATION: 94 % | TEMPERATURE: 98 F | HEART RATE: 68 BPM | SYSTOLIC BLOOD PRESSURE: 125 MMHG | RESPIRATION RATE: 16 BRPM

## 2019-06-27 LAB
ANION GAP SERPL CALC-SCNC: 10 MMOL/L — SIGNIFICANT CHANGE UP (ref 5–17)
APTT BLD: 38.8 SEC — HIGH (ref 27.5–36.3)
BUN SERPL-MCNC: 10 MG/DL — SIGNIFICANT CHANGE UP (ref 7–23)
CALCIUM SERPL-MCNC: 8.8 MG/DL — SIGNIFICANT CHANGE UP (ref 8.4–10.5)
CHLORIDE SERPL-SCNC: 107 MMOL/L — SIGNIFICANT CHANGE UP (ref 96–108)
CO2 SERPL-SCNC: 23 MMOL/L — SIGNIFICANT CHANGE UP (ref 22–31)
CREAT SERPL-MCNC: 0.8 MG/DL — SIGNIFICANT CHANGE UP (ref 0.5–1.3)
GLUCOSE SERPL-MCNC: 118 MG/DL — HIGH (ref 70–99)
HCT VFR BLD CALC: 44.2 % — SIGNIFICANT CHANGE UP (ref 39–50)
HGB BLD-MCNC: 14.9 G/DL — SIGNIFICANT CHANGE UP (ref 13–17)
INR BLD: 3.31 — HIGH (ref 0.88–1.16)
MAGNESIUM SERPL-MCNC: 1.8 MG/DL — SIGNIFICANT CHANGE UP (ref 1.6–2.6)
MCHC RBC-ENTMCNC: 31.8 PG — SIGNIFICANT CHANGE UP (ref 27–34)
MCHC RBC-ENTMCNC: 33.7 GM/DL — SIGNIFICANT CHANGE UP (ref 32–36)
MCV RBC AUTO: 94.2 FL — SIGNIFICANT CHANGE UP (ref 80–100)
NRBC # BLD: 0 /100 WBCS — SIGNIFICANT CHANGE UP (ref 0–0)
PHOSPHATE SERPL-MCNC: 2.8 MG/DL — SIGNIFICANT CHANGE UP (ref 2.5–4.5)
PLATELET # BLD AUTO: 159 K/UL — SIGNIFICANT CHANGE UP (ref 150–400)
POTASSIUM SERPL-MCNC: 4 MMOL/L — SIGNIFICANT CHANGE UP (ref 3.5–5.3)
POTASSIUM SERPL-SCNC: 4 MMOL/L — SIGNIFICANT CHANGE UP (ref 3.5–5.3)
PROTHROM AB SERPL-ACNC: 38.8 SEC — HIGH (ref 10–12.9)
RBC # BLD: 4.69 M/UL — SIGNIFICANT CHANGE UP (ref 4.2–5.8)
RBC # FLD: 12.1 % — SIGNIFICANT CHANGE UP (ref 10.3–14.5)
SODIUM SERPL-SCNC: 140 MMOL/L — SIGNIFICANT CHANGE UP (ref 135–145)
WBC # BLD: 10.82 K/UL — HIGH (ref 3.8–10.5)
WBC # FLD AUTO: 10.82 K/UL — HIGH (ref 3.8–10.5)

## 2019-06-27 PROCEDURE — 82962 GLUCOSE BLOOD TEST: CPT

## 2019-06-27 PROCEDURE — 36415 COLL VENOUS BLD VENIPUNCTURE: CPT

## 2019-06-27 PROCEDURE — 83735 ASSAY OF MAGNESIUM: CPT

## 2019-06-27 PROCEDURE — 85730 THROMBOPLASTIN TIME PARTIAL: CPT

## 2019-06-27 PROCEDURE — 70450 CT HEAD/BRAIN W/O DYE: CPT

## 2019-06-27 PROCEDURE — 84443 ASSAY THYROID STIM HORMONE: CPT

## 2019-06-27 PROCEDURE — 93005 ELECTROCARDIOGRAM TRACING: CPT

## 2019-06-27 PROCEDURE — 71045 X-RAY EXAM CHEST 1 VIEW: CPT

## 2019-06-27 PROCEDURE — 99239 HOSP IP/OBS DSCHRG MGMT >30: CPT | Mod: GC

## 2019-06-27 PROCEDURE — 81001 URINALYSIS AUTO W/SCOPE: CPT

## 2019-06-27 PROCEDURE — 97161 PT EVAL LOW COMPLEX 20 MIN: CPT

## 2019-06-27 PROCEDURE — 85025 COMPLETE CBC W/AUTO DIFF WBC: CPT

## 2019-06-27 PROCEDURE — 74177 CT ABD & PELVIS W/CONTRAST: CPT

## 2019-06-27 PROCEDURE — 86900 BLOOD TYPING SEROLOGIC ABO: CPT

## 2019-06-27 PROCEDURE — 86901 BLOOD TYPING SEROLOGIC RH(D): CPT

## 2019-06-27 PROCEDURE — 80048 BASIC METABOLIC PNL TOTAL CA: CPT

## 2019-06-27 PROCEDURE — 93970 EXTREMITY STUDY: CPT

## 2019-06-27 PROCEDURE — 80053 COMPREHEN METABOLIC PANEL: CPT

## 2019-06-27 PROCEDURE — 84100 ASSAY OF PHOSPHORUS: CPT

## 2019-06-27 PROCEDURE — 99285 EMERGENCY DEPT VISIT HI MDM: CPT | Mod: 25

## 2019-06-27 PROCEDURE — 85610 PROTHROMBIN TIME: CPT

## 2019-06-27 PROCEDURE — 85027 COMPLETE CBC AUTOMATED: CPT

## 2019-06-27 PROCEDURE — 97116 GAIT TRAINING THERAPY: CPT

## 2019-06-27 PROCEDURE — 86850 RBC ANTIBODY SCREEN: CPT

## 2019-06-27 RX ORDER — WARFARIN SODIUM 2.5 MG/1
1 TABLET ORAL
Qty: 0 | Refills: 0 | DISCHARGE

## 2019-06-27 RX ORDER — ACEBUTOLOL HCL 200 MG
1 CAPSULE ORAL
Qty: 0 | Refills: 0 | DISCHARGE

## 2019-06-27 RX ORDER — ASPIRIN/CALCIUM CARB/MAGNESIUM 324 MG
1 TABLET ORAL
Qty: 0 | Refills: 0 | DISCHARGE
Start: 2019-06-27

## 2019-06-27 RX ORDER — LANOLIN ALCOHOL/MO/W.PET/CERES
1 CREAM (GRAM) TOPICAL
Qty: 0 | Refills: 0 | DISCHARGE
Start: 2019-06-27

## 2019-06-27 RX ADMIN — TAMSULOSIN HYDROCHLORIDE 0.4 MILLIGRAM(S): 0.4 CAPSULE ORAL at 06:09

## 2019-06-27 RX ADMIN — MEMANTINE HYDROCHLORIDE 10 MILLIGRAM(S): 10 TABLET ORAL at 06:10

## 2019-06-27 RX ADMIN — SODIUM CHLORIDE 80 MILLILITER(S): 9 INJECTION INTRAMUSCULAR; INTRAVENOUS; SUBCUTANEOUS at 06:28

## 2019-06-27 RX ADMIN — DORZOLAMIDE HYDROCHLORIDE 1 DROP(S): 20 SOLUTION/ DROPS OPHTHALMIC at 06:10

## 2019-06-27 RX ADMIN — DIVALPROEX SODIUM 250 MILLIGRAM(S): 500 TABLET, DELAYED RELEASE ORAL at 06:09

## 2019-06-27 RX ADMIN — ESCITALOPRAM OXALATE 10 MILLIGRAM(S): 10 TABLET, FILM COATED ORAL at 13:02

## 2019-06-27 NOTE — DISCHARGE NOTE NURSING/CASE MANAGEMENT/SOCIAL WORK - NSDCDPATPORTLINK_GEN_ALL_CORE
You can access the [facility] Portal, offered by [facility], by registering with the following website: [email or web address]

## 2019-06-27 NOTE — DISCHARGE NOTE NURSING/CASE MANAGEMENT/SOCIAL WORK - NSDCFUADDAPPT_GEN_ALL_CORE_FT
Please follow up with [facility]rology Clinic when you are discharged [phone].   Please follow up with your primary care physician, Dr. Delaney when you are discharged.

## 2019-07-02 DIAGNOSIS — Z87.891 PERSONAL HISTORY OF NICOTINE DEPENDENCE: ICD-10-CM

## 2019-07-02 DIAGNOSIS — R00.1 BRADYCARDIA, UNSPECIFIED: ICD-10-CM

## 2019-07-02 DIAGNOSIS — R53.1 WEAKNESS: ICD-10-CM

## 2019-07-02 DIAGNOSIS — E11.9 TYPE 2 DIABETES MELLITUS WITHOUT COMPLICATIONS: ICD-10-CM

## 2019-07-02 DIAGNOSIS — Z79.01 LONG TERM (CURRENT) USE OF ANTICOAGULANTS: ICD-10-CM

## 2019-07-02 DIAGNOSIS — N40.0 BENIGN PROSTATIC HYPERPLASIA WITHOUT LOWER URINARY TRACT SYMPTOMS: ICD-10-CM

## 2019-07-02 DIAGNOSIS — T50.B95A ADVERSE EFFECT OF OTHER VIRAL VACCINES, INITIAL ENCOUNTER: ICD-10-CM

## 2019-07-02 DIAGNOSIS — R31.0 GROSS HEMATURIA: ICD-10-CM

## 2019-07-02 DIAGNOSIS — G30.9 ALZHEIMER'S DISEASE, UNSPECIFIED: ICD-10-CM

## 2019-07-02 DIAGNOSIS — T45.515A ADVERSE EFFECT OF ANTICOAGULANTS, INITIAL ENCOUNTER: ICD-10-CM

## 2019-07-02 DIAGNOSIS — I48.0 PAROXYSMAL ATRIAL FIBRILLATION: ICD-10-CM

## 2019-07-02 DIAGNOSIS — Z66 DO NOT RESUSCITATE: ICD-10-CM

## 2019-07-02 DIAGNOSIS — F02.80 DEMENTIA IN OTHER DISEASES CLASSIFIED ELSEWHERE, UNSPECIFIED SEVERITY, WITHOUT BEHAVIORAL DISTURBANCE, PSYCHOTIC DISTURBANCE, MOOD DISTURBANCE, AND ANXIETY: ICD-10-CM

## 2019-07-02 DIAGNOSIS — Z95.2 PRESENCE OF PROSTHETIC HEART VALVE: ICD-10-CM

## 2019-08-06 ENCOUNTER — APPOINTMENT (OUTPATIENT)
Dept: HEART AND VASCULAR | Facility: CLINIC | Age: 74
End: 2019-08-06

## 2022-12-19 NOTE — PHYSICAL THERAPY INITIAL EVALUATION ADULT - GAIT PATTERN USED, PT EVAL
reciprocal gait w/ RW Gabapentin Counseling: I discussed with the patient the risks of gabapentin including but not limited to dizziness, somnolence, fatigue and ataxia.
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
## 2025-01-08 ENCOUNTER — TRANSCRIPTION ENCOUNTER (OUTPATIENT)
Age: 80
End: 2025-01-08